# Patient Record
Sex: FEMALE | Race: WHITE | NOT HISPANIC OR LATINO
[De-identification: names, ages, dates, MRNs, and addresses within clinical notes are randomized per-mention and may not be internally consistent; named-entity substitution may affect disease eponyms.]

---

## 2017-04-21 ENCOUNTER — MEDICATION RENEWAL (OUTPATIENT)
Age: 27
End: 2017-04-21

## 2017-04-21 DIAGNOSIS — T75.3XXA MOTION SICKNESS, INITIAL ENCOUNTER: ICD-10-CM

## 2017-07-31 ENCOUNTER — APPOINTMENT (OUTPATIENT)
Dept: INTERNAL MEDICINE | Facility: CLINIC | Age: 27
End: 2017-07-31
Payer: COMMERCIAL

## 2017-07-31 VITALS
HEIGHT: 64 IN | WEIGHT: 98 LBS | HEART RATE: 94 BPM | BODY MASS INDEX: 16.73 KG/M2 | TEMPERATURE: 97.9 F | DIASTOLIC BLOOD PRESSURE: 72 MMHG | RESPIRATION RATE: 14 BRPM | OXYGEN SATURATION: 99 % | SYSTOLIC BLOOD PRESSURE: 104 MMHG

## 2017-07-31 DIAGNOSIS — J06.9 ACUTE UPPER RESPIRATORY INFECTION, UNSPECIFIED: ICD-10-CM

## 2017-07-31 PROCEDURE — 99214 OFFICE O/P EST MOD 30 MIN: CPT

## 2017-07-31 RX ORDER — SPIRONOLACTONE 25 MG/1
25 TABLET ORAL
Qty: 90 | Refills: 0 | Status: ACTIVE | COMMUNITY
Start: 2017-04-04

## 2017-07-31 RX ORDER — ALBUTEROL SULFATE 90 UG/1
108 (90 BASE) POWDER, METERED RESPIRATORY (INHALATION)
Qty: 1 | Refills: 0 | Status: ACTIVE | COMMUNITY
Start: 2017-07-27

## 2017-07-31 RX ORDER — METHYLPREDNISOLONE 4 MG/1
4 TABLET ORAL
Qty: 1 | Refills: 0 | Status: ACTIVE | COMMUNITY
Start: 2017-07-31 | End: 1900-01-01

## 2018-03-26 ENCOUNTER — MED ADMIN CHARGE (OUTPATIENT)
Age: 28
End: 2018-03-26

## 2018-03-26 RX ORDER — SCOPALAMINE 1 MG/3D
1 PATCH, EXTENDED RELEASE TRANSDERMAL
Qty: 1 | Refills: 0 | Status: ACTIVE | COMMUNITY
Start: 2017-04-21 | End: 1900-01-01

## 2018-05-08 ENCOUNTER — TRANSCRIPTION ENCOUNTER (OUTPATIENT)
Age: 28
End: 2018-05-08

## 2018-08-02 ENCOUNTER — TRANSCRIPTION ENCOUNTER (OUTPATIENT)
Age: 28
End: 2018-08-02

## 2018-10-30 ENCOUNTER — EMERGENCY (EMERGENCY)
Facility: HOSPITAL | Age: 28
LOS: 1 days | Discharge: HOME | End: 2018-10-30
Attending: EMERGENCY MEDICINE
Payer: COMMERCIAL

## 2018-10-30 VITALS
WEIGHT: 102.96 LBS | TEMPERATURE: 98 F | DIASTOLIC BLOOD PRESSURE: 102 MMHG | OXYGEN SATURATION: 99 % | HEART RATE: 100 BPM | HEIGHT: 64 IN | SYSTOLIC BLOOD PRESSURE: 154 MMHG | RESPIRATION RATE: 20 BRPM

## 2018-10-30 VITALS
OXYGEN SATURATION: 98 % | RESPIRATION RATE: 16 BRPM | DIASTOLIC BLOOD PRESSURE: 76 MMHG | TEMPERATURE: 98 F | SYSTOLIC BLOOD PRESSURE: 109 MMHG | HEART RATE: 75 BPM

## 2018-10-30 LAB
ALBUMIN SERPL ELPH-MCNC: 4.4 G/DL — SIGNIFICANT CHANGE UP (ref 3.3–5)
ALP SERPL-CCNC: 54 U/L — SIGNIFICANT CHANGE UP (ref 40–120)
ALT FLD-CCNC: 10 U/L — SIGNIFICANT CHANGE UP (ref 10–45)
ANION GAP SERPL CALC-SCNC: 13 MMOL/L — SIGNIFICANT CHANGE UP (ref 5–17)
APPEARANCE UR: CLEAR — SIGNIFICANT CHANGE UP
AST SERPL-CCNC: 15 U/L — SIGNIFICANT CHANGE UP (ref 10–40)
BACTERIA # UR AUTO: NEGATIVE — SIGNIFICANT CHANGE UP
BASOPHILS # BLD AUTO: 0 K/UL — SIGNIFICANT CHANGE UP (ref 0–0.2)
BASOPHILS NFR BLD AUTO: 0.5 % — SIGNIFICANT CHANGE UP (ref 0–2)
BILIRUB SERPL-MCNC: 0.4 MG/DL — SIGNIFICANT CHANGE UP (ref 0.2–1.2)
BILIRUB UR-MCNC: NEGATIVE — SIGNIFICANT CHANGE UP
BUN SERPL-MCNC: 9 MG/DL — SIGNIFICANT CHANGE UP (ref 7–23)
CALCIUM SERPL-MCNC: 9.3 MG/DL — SIGNIFICANT CHANGE UP (ref 8.4–10.5)
CHLORIDE SERPL-SCNC: 102 MMOL/L — SIGNIFICANT CHANGE UP (ref 96–108)
CO2 SERPL-SCNC: 23 MMOL/L — SIGNIFICANT CHANGE UP (ref 22–31)
COLOR SPEC: COLORLESS — SIGNIFICANT CHANGE UP
CREAT SERPL-MCNC: 0.56 MG/DL — SIGNIFICANT CHANGE UP (ref 0.5–1.3)
DIFF PNL FLD: NEGATIVE — SIGNIFICANT CHANGE UP
EOSINOPHIL # BLD AUTO: 0.1 K/UL — SIGNIFICANT CHANGE UP (ref 0–0.5)
EOSINOPHIL NFR BLD AUTO: 1.4 % — SIGNIFICANT CHANGE UP (ref 0–6)
EPI CELLS # UR: 0 /HPF — SIGNIFICANT CHANGE UP
GLUCOSE SERPL-MCNC: 99 MG/DL — SIGNIFICANT CHANGE UP (ref 70–99)
GLUCOSE UR QL: NEGATIVE — SIGNIFICANT CHANGE UP
HCT VFR BLD CALC: 42.2 % — SIGNIFICANT CHANGE UP (ref 34.5–45)
HGB BLD-MCNC: 14.2 G/DL — SIGNIFICANT CHANGE UP (ref 11.5–15.5)
HYALINE CASTS # UR AUTO: 0 /LPF — SIGNIFICANT CHANGE UP (ref 0–2)
KETONES UR-MCNC: SIGNIFICANT CHANGE UP
LEUKOCYTE ESTERASE UR-ACNC: NEGATIVE — SIGNIFICANT CHANGE UP
LYMPHOCYTES # BLD AUTO: 1.9 K/UL — SIGNIFICANT CHANGE UP (ref 1–3.3)
LYMPHOCYTES # BLD AUTO: 25.5 % — SIGNIFICANT CHANGE UP (ref 13–44)
MCHC RBC-ENTMCNC: 30.7 PG — SIGNIFICANT CHANGE UP (ref 27–34)
MCHC RBC-ENTMCNC: 33.6 GM/DL — SIGNIFICANT CHANGE UP (ref 32–36)
MCV RBC AUTO: 91.2 FL — SIGNIFICANT CHANGE UP (ref 80–100)
MONOCYTES # BLD AUTO: 0.6 K/UL — SIGNIFICANT CHANGE UP (ref 0–0.9)
MONOCYTES NFR BLD AUTO: 7.6 % — SIGNIFICANT CHANGE UP (ref 2–14)
NEUTROPHILS # BLD AUTO: 4.8 K/UL — SIGNIFICANT CHANGE UP (ref 1.8–7.4)
NEUTROPHILS NFR BLD AUTO: 65 % — SIGNIFICANT CHANGE UP (ref 43–77)
NITRITE UR-MCNC: NEGATIVE — SIGNIFICANT CHANGE UP
PH UR: 7 — SIGNIFICANT CHANGE UP (ref 5–8)
PLATELET # BLD AUTO: 261 K/UL — SIGNIFICANT CHANGE UP (ref 150–400)
POTASSIUM SERPL-MCNC: 3.8 MMOL/L — SIGNIFICANT CHANGE UP (ref 3.5–5.3)
POTASSIUM SERPL-SCNC: 3.8 MMOL/L — SIGNIFICANT CHANGE UP (ref 3.5–5.3)
PROT SERPL-MCNC: 7.6 G/DL — SIGNIFICANT CHANGE UP (ref 6–8.3)
PROT UR-MCNC: NEGATIVE — SIGNIFICANT CHANGE UP
RBC # BLD: 4.63 M/UL — SIGNIFICANT CHANGE UP (ref 3.8–5.2)
RBC # FLD: 11.5 % — SIGNIFICANT CHANGE UP (ref 10.3–14.5)
RBC CASTS # UR COMP ASSIST: 2 /HPF — SIGNIFICANT CHANGE UP (ref 0–4)
SODIUM SERPL-SCNC: 138 MMOL/L — SIGNIFICANT CHANGE UP (ref 135–145)
SP GR SPEC: 1.01 — LOW (ref 1.01–1.02)
UROBILINOGEN FLD QL: NEGATIVE — SIGNIFICANT CHANGE UP
WBC # BLD: 7.3 K/UL — SIGNIFICANT CHANGE UP (ref 3.8–10.5)
WBC # FLD AUTO: 7.3 K/UL — SIGNIFICANT CHANGE UP (ref 3.8–10.5)
WBC UR QL: 0 /HPF — SIGNIFICANT CHANGE UP (ref 0–5)

## 2018-10-30 PROCEDURE — 81001 URINALYSIS AUTO W/SCOPE: CPT

## 2018-10-30 PROCEDURE — 93975 VASCULAR STUDY: CPT | Mod: 26

## 2018-10-30 PROCEDURE — 76830 TRANSVAGINAL US NON-OB: CPT

## 2018-10-30 PROCEDURE — 76830 TRANSVAGINAL US NON-OB: CPT | Mod: 26

## 2018-10-30 PROCEDURE — 74177 CT ABD & PELVIS W/CONTRAST: CPT | Mod: 26

## 2018-10-30 PROCEDURE — 93975 VASCULAR STUDY: CPT

## 2018-10-30 PROCEDURE — 84702 CHORIONIC GONADOTROPIN TEST: CPT

## 2018-10-30 PROCEDURE — 87086 URINE CULTURE/COLONY COUNT: CPT

## 2018-10-30 PROCEDURE — 80053 COMPREHEN METABOLIC PANEL: CPT

## 2018-10-30 PROCEDURE — 76705 ECHO EXAM OF ABDOMEN: CPT | Mod: 26,59

## 2018-10-30 PROCEDURE — 74177 CT ABD & PELVIS W/CONTRAST: CPT

## 2018-10-30 PROCEDURE — 85027 COMPLETE CBC AUTOMATED: CPT

## 2018-10-30 PROCEDURE — 99284 EMERGENCY DEPT VISIT MOD MDM: CPT | Mod: 25

## 2018-10-30 PROCEDURE — 76705 ECHO EXAM OF ABDOMEN: CPT

## 2018-10-30 RX ORDER — ACETAMINOPHEN 500 MG
975 TABLET ORAL ONCE
Qty: 0 | Refills: 0 | Status: COMPLETED | OUTPATIENT
Start: 2018-10-30 | End: 2018-10-30

## 2018-10-30 RX ORDER — ACETAMINOPHEN 500 MG
650 TABLET ORAL ONCE
Qty: 0 | Refills: 0 | Status: COMPLETED | OUTPATIENT
Start: 2018-10-30 | End: 2018-10-30

## 2018-10-30 RX ADMIN — Medication 650 MILLIGRAM(S): at 13:00

## 2018-10-30 RX ADMIN — Medication 975 MILLIGRAM(S): at 07:28

## 2018-10-30 NOTE — ED ADULT NURSE NOTE - OBJECTIVE STATEMENT
29 y/o female presents to ED c/o R sided abdominal pain that started at 1:30am this morning. Pt was laying in bed when she began feeling "excruciating, sharp pain on the right side." She felt like she had to make a BM, went to bathroom and had one episode of soft stool, which she says gave her mild relief. Pain was intermittent after that and at its worst is 9/10. Also reports some chills and nausea when pain worsens. Denies chest pain, SOB, vomiting, fever. Lungs clear b/l. Skin warm, dry, intact. Abd. soft, non-distended, tender upon palpation by RLQ. Gross motor and neuro intact. 20G IV placed in LAC. Safety and comfort provided. Family at bedside.

## 2018-10-30 NOTE — ED ADULT NURSE NOTE - ADDITIONAL PRINTED INSTRUCTIONS GIVEN
Deirdre CHAUDHARI, pt verbalizes understanding to f/u with PCP and return to ED for any worsening symptoms.

## 2018-10-30 NOTE — ED PROVIDER NOTE - MEDICAL DECISION MAKING DETAILS
28F hx of right ovarian cyst w/ rlq pain, concern for torsion vs appendicits vs cyst rupture, no GI or dysuria, pain control, u/s followed by possible ct pending results,

## 2018-10-30 NOTE — ED PROVIDER NOTE - PHYSICAL EXAMINATION
Gen: AAOx3, NAD, well appearing   Head: NCAT  ENT: Airway patent, moist mucous membrane  Cardiac: Normal rate, normal rhythm, no murmurs  Respiratory: Lungs CTA B/L  Gastrointestinal: Abdomen soft, + TTP RLQ, + mcburneys, + rovsings, nondistended, no rebound, no guarding, no organomegaly   : Chaperoned by Deirdre Li RN, no adnexal tenderness, white thin discharge in vaginal vault with normal nonparous cervix, no adnexal fullness appreciated, neg CVAT   MSK: No gross abnormalities, FROM of all four extremities, no edema  HEME: Extremities warm, pulses intact and symmetrical in all four extremities  Skin: No rashes, no lesions  Neuro: No gross neurologic deficits

## 2018-10-30 NOTE — ED PROVIDER NOTE - NS ED ROS FT
CONST: no fevers, no chills, weight loss, weakness, appetite changes  EYES: no pain, vision loss/dipoplia/decreased vision  ENT: no sore throat, no cough  CV: no chest pain, no palpitations  RESP: no shortness of breath  ABD: no abdominal pain, no nausea, no vomitting  : no dysuria, increased frequency, or hematuria  MSK: no back pain  NEURO: no headache or additional neurologic complaints  HEME: no easy bleeding  SKIN:  no rash CONST: no fevers, no chills, no weight loss, no weakness, +appetite changes  EYES: no pain or vision changes  ENT: no sore throat, no cough  CV: no chest pain, no palpitations  RESP: no shortness of breath  ABD: + abdominal pain, + nausea, no vomiting  : no dysuria, increased frequency, or hematuria  MSK: no back pain  NEURO: no headache or additional neurologic complaints  HEME: no easy bleeding  SKIN:  no rash

## 2018-10-30 NOTE — ED PROVIDER NOTE - PROGRESS NOTE DETAILS
Magallon PGY2: Pt continues to have dull abd pain, CT scans reviewed with some free pelvic fluid, possibility of cyst rupture, no adnexal tenderness on bimanual exam, and otherwise skin exam unremarkable, will have pt f/u with outpt pcp and return precautions given.

## 2018-10-30 NOTE — ED PROVIDER NOTE - CARE PLAN
Principal Discharge DX:	Abdominal pain  Assessment and plan of treatment:	You were seen today for abdominal pain with some free pelvic fluid suggesting the possible of an ovarian cyst rupture. There was no appendicitis seen on your CT scan, please return if you develop fevers/ chills severe worsening or pain or inability to tolerate foods or if characterization of pain changes and is worse.     1) Please follow-up with your primary care doctor within the next 3 days.  Please call today or tomorrow for an appointment.  If you cannot follow-up with your doctor(s), please return to the ED for any urgent issues.  2) If you have any worsening of symptoms or any other concerns please return to the ED immediately.  3) Please continue taking your home medications as directed.  4) You may have been given a copy of your labs and/or imaging.  Please go over these with your primary care doctor.

## 2018-10-30 NOTE — ED ADULT TRIAGE NOTE - CHIEF COMPLAINT QUOTE
Patient reports abdominal pain since 130 AM.  Patient c/o R lower quadrant abdominal pain.  Pt has hx ovarian cysts, but states this pain is different.  Pt states the pain comes and goes.  +nausea, no vomiting

## 2018-10-30 NOTE — ED ADULT NURSE NOTE - NSIMPLEMENTINTERV_GEN_ALL_ED
Implemented All Universal Safety Interventions:  [place] to call system. Call bell, personal items and telephone within reach. Instruct patient to call for assistance. Room bathroom lighting operational. Non-slip footwear when patient is off stretcher. Physically safe environment: no spills, clutter or unnecessary equipment. Stretcher in lowest position, wheels locked, appropriate side rails in place.

## 2018-10-30 NOTE — ED ADULT NURSE REASSESSMENT NOTE - NS ED NURSE REASSESS COMMENT FT1
Pt AAOx4, NAD, resp nonlabored, resting comfortably in bed with family at bedside. Pt c/o continued RLQ abd pain, pain meds administered as per MD orders. Pt denies headache, dizziness, chest pain, palpitations, SOB, abd pain, n/v/d, urinary symptoms, fevers, chills, weakness at this time. Pt awaiting US, Dr. Magallon completed pelvic exam with RN present. Safety maintained with call bell within reach.

## 2018-10-30 NOTE — ED PROVIDER NOTE - PLAN OF CARE
You were seen today for abdominal pain with some free pelvic fluid suggesting the possible of an ovarian cyst rupture. There was no appendicitis seen on your CT scan, please return if you develop fevers/ chills severe worsening or pain or inability to tolerate foods or if characterization of pain changes and is worse.     1) Please follow-up with your primary care doctor within the next 3 days.  Please call today or tomorrow for an appointment.  If you cannot follow-up with your doctor(s), please return to the ED for any urgent issues.  2) If you have any worsening of symptoms or any other concerns please return to the ED immediately.  3) Please continue taking your home medications as directed.  4) You may have been given a copy of your labs and/or imaging.  Please go over these with your primary care doctor.

## 2018-10-30 NOTE — ED PROVIDER NOTE - OBJECTIVE STATEMENT
28F hx right ovarian cyst c/o RLQ pain since 1:30AM, noted as sharp, localized, intermittent in waves, with associated nausea without emesis, no dysuria or diarrhea, no vaginal discharge, LMP 1.5 wks ago, 28F hx right ovarian cyst c/o RLQ pain since 1:30AM, noted as sharp, localized, intermittent in waves, worsened with movement/ extension, with associated nausea without emesis, no dysuria or diarrhea, no vaginal discharge, LMP 1.5 wks ago, notes some decrease in appetite today 28F hx right ovarian cyst c/o RLQ pain since 1:30AM, noted as sharp, localized, intermittent in waves, worsened with movement/ extension, with associated nausea without emesis, no dysuria or diarrhea, no vaginal discharge, LMP 1.5 wks ago, notes some decrease in appetite today    Attendinyo female presents with RLQ pain since 1am today associated with decreased appetite.  No fever.  no vomiting . pain worse with movement.  felt fine yesterday.  pain is sharp.

## 2018-10-31 LAB
CULTURE RESULTS: NO GROWTH — SIGNIFICANT CHANGE UP
SPECIMEN SOURCE: SIGNIFICANT CHANGE UP

## 2018-11-03 NOTE — PROCEDURE NOTE - ADDITIONAL PROCEDURE DETAILS
Emergency Department Focused Ultrasound performed at patient's bedside for educational purposes. The study will have a follow up study performed or was performed in the direct supervision of an ultrasound trained attending.
Emergency Department Focused Ultrasound performed at patient's bedside for educational purposes. The study will have a follow up study performed or was performed in the direct supervision of an ultrasound trained attending.

## 2019-07-18 ENCOUNTER — RESULT REVIEW (OUTPATIENT)
Age: 29
End: 2019-07-18

## 2019-12-09 ENCOUNTER — TRANSCRIPTION ENCOUNTER (OUTPATIENT)
Age: 29
End: 2019-12-09

## 2020-12-10 ENCOUNTER — TRANSCRIPTION ENCOUNTER (OUTPATIENT)
Age: 30
End: 2020-12-10

## 2020-12-15 PROBLEM — J06.9 UPPER RESPIRATORY INFECTION, ACUTE: Status: RESOLVED | Noted: 2017-07-31 | Resolved: 2020-12-15

## 2021-11-23 ENCOUNTER — TRANSCRIPTION ENCOUNTER (OUTPATIENT)
Age: 31
End: 2021-11-23

## 2022-10-10 ENCOUNTER — RESULT REVIEW (OUTPATIENT)
Age: 32
End: 2022-10-10

## 2023-02-17 ENCOUNTER — NON-APPOINTMENT (OUTPATIENT)
Age: 33
End: 2023-02-17

## 2023-07-11 ENCOUNTER — APPOINTMENT (OUTPATIENT)
Dept: OBGYN | Facility: CLINIC | Age: 33
End: 2023-07-11
Payer: COMMERCIAL

## 2023-07-11 VITALS
SYSTOLIC BLOOD PRESSURE: 125 MMHG | HEIGHT: 64 IN | DIASTOLIC BLOOD PRESSURE: 79 MMHG | BODY MASS INDEX: 19.97 KG/M2 | WEIGHT: 117 LBS

## 2023-07-11 DIAGNOSIS — N92.6 IRREGULAR MENSTRUATION, UNSPECIFIED: ICD-10-CM

## 2023-07-11 DIAGNOSIS — N63.32 UNSPECIFIED LUMP IN AXILLARY TAIL OF THE LEFT BREAST: ICD-10-CM

## 2023-07-11 DIAGNOSIS — Z01.419 ENCOUNTER FOR GYNECOLOGICAL EXAMINATION (GENERAL) (ROUTINE) W/OUT ABNORMAL FINDINGS: ICD-10-CM

## 2023-07-11 PROCEDURE — 99385 PREV VISIT NEW AGE 18-39: CPT

## 2023-07-11 NOTE — HISTORY OF PRESENT ILLNESS
[FreeTextEntry1] : pt is a 32 y/o p0 lmp 6/14 presents for new pt annual gyn visit with report of change in menstrual bleeding since covid.

## 2023-07-13 LAB
CYTOLOGY CVX/VAG DOC THIN PREP: NORMAL
HPV HIGH+LOW RISK DNA PNL CVX: NOT DETECTED

## 2023-07-19 LAB
ANTI-MUELLERIAN HORMONE: 3.39 NG/ML
FSH SERPL-MCNC: 7.3 IU/L
LH SERPL-ACNC: 10 IU/L
PROLACTIN SERPL-MCNC: 14.8 NG/ML
TESTOST SERPL-MCNC: 19.1 NG/DL
TSH SERPL-ACNC: 1.89 UIU/ML

## 2023-07-24 LAB
17OHP SERPL-MCNC: 47 NG/DL
DHEA-SULFATE, SERUM: 73 UG/DL

## 2023-08-29 ENCOUNTER — NON-APPOINTMENT (OUTPATIENT)
Age: 33
End: 2023-08-29

## 2023-09-25 ENCOUNTER — NON-APPOINTMENT (OUTPATIENT)
Age: 33
End: 2023-09-25

## 2024-06-03 ENCOUNTER — ASOB RESULT (OUTPATIENT)
Age: 34
End: 2024-06-03

## 2024-06-03 ENCOUNTER — APPOINTMENT (OUTPATIENT)
Dept: ANTEPARTUM | Facility: CLINIC | Age: 34
End: 2024-06-03
Payer: COMMERCIAL

## 2024-06-03 PROCEDURE — 76811 OB US DETAILED SNGL FETUS: CPT

## 2024-06-19 ENCOUNTER — ASOB RESULT (OUTPATIENT)
Age: 34
End: 2024-06-19

## 2024-06-19 ENCOUNTER — APPOINTMENT (OUTPATIENT)
Dept: ANTEPARTUM | Facility: CLINIC | Age: 34
End: 2024-06-19
Payer: COMMERCIAL

## 2024-06-19 PROCEDURE — 76816 OB US FOLLOW-UP PER FETUS: CPT

## 2024-07-23 ENCOUNTER — NON-APPOINTMENT (OUTPATIENT)
Age: 34
End: 2024-07-23

## 2024-10-12 ENCOUNTER — INPATIENT (INPATIENT)
Facility: HOSPITAL | Age: 34
LOS: 3 days | Discharge: HOME | End: 2024-10-16
Attending: OBSTETRICS & GYNECOLOGY | Admitting: OBSTETRICS & GYNECOLOGY
Payer: COMMERCIAL

## 2024-10-12 VITALS
RESPIRATION RATE: 18 BRPM | OXYGEN SATURATION: 96 % | SYSTOLIC BLOOD PRESSURE: 126 MMHG | TEMPERATURE: 98 F | DIASTOLIC BLOOD PRESSURE: 83 MMHG | HEART RATE: 92 BPM

## 2024-10-12 DIAGNOSIS — O26.899 OTHER SPECIFIED PREGNANCY RELATED CONDITIONS, UNSPECIFIED TRIMESTER: ICD-10-CM

## 2024-10-12 DIAGNOSIS — Z90.89 ACQUIRED ABSENCE OF OTHER ORGANS: Chronic | ICD-10-CM

## 2024-10-12 DIAGNOSIS — Z98.890 OTHER SPECIFIED POSTPROCEDURAL STATES: Chronic | ICD-10-CM

## 2024-10-12 DIAGNOSIS — Z86.19 PERSONAL HISTORY OF OTHER INFECTIOUS AND PARASITIC DISEASES: Chronic | ICD-10-CM

## 2024-10-12 DIAGNOSIS — Z34.80 ENCOUNTER FOR SUPERVISION OF OTHER NORMAL PREGNANCY, UNSPECIFIED TRIMESTER: ICD-10-CM

## 2024-10-12 LAB
BASOPHILS # BLD AUTO: 0.07 K/UL — SIGNIFICANT CHANGE UP (ref 0–0.2)
BASOPHILS NFR BLD AUTO: 0.7 % — SIGNIFICANT CHANGE UP (ref 0–2)
EOSINOPHIL # BLD AUTO: 0.07 K/UL — SIGNIFICANT CHANGE UP (ref 0–0.5)
EOSINOPHIL NFR BLD AUTO: 0.7 % — SIGNIFICANT CHANGE UP (ref 0–6)
GLUCOSE BLDC GLUCOMTR-MCNC: 125 MG/DL — HIGH (ref 70–99)
HCT VFR BLD CALC: 38.6 % — SIGNIFICANT CHANGE UP (ref 34.5–45)
HGB BLD-MCNC: 12.7 G/DL — SIGNIFICANT CHANGE UP (ref 11.5–15.5)
IMM GRANULOCYTES NFR BLD AUTO: 1.2 % — HIGH (ref 0–0.9)
LYMPHOCYTES # BLD AUTO: 1.86 K/UL — SIGNIFICANT CHANGE UP (ref 1–3.3)
LYMPHOCYTES # BLD AUTO: 17.8 % — SIGNIFICANT CHANGE UP (ref 13–44)
MCHC RBC-ENTMCNC: 30.5 PG — SIGNIFICANT CHANGE UP (ref 27–34)
MCHC RBC-ENTMCNC: 32.9 GM/DL — SIGNIFICANT CHANGE UP (ref 32–36)
MCV RBC AUTO: 92.6 FL — SIGNIFICANT CHANGE UP (ref 80–100)
MONOCYTES # BLD AUTO: 0.85 K/UL — SIGNIFICANT CHANGE UP (ref 0–0.9)
MONOCYTES NFR BLD AUTO: 8.1 % — SIGNIFICANT CHANGE UP (ref 2–14)
NEUTROPHILS # BLD AUTO: 7.48 K/UL — HIGH (ref 1.8–7.4)
NEUTROPHILS NFR BLD AUTO: 71.5 % — SIGNIFICANT CHANGE UP (ref 43–77)
NRBC # BLD: 0 /100 WBCS — SIGNIFICANT CHANGE UP (ref 0–0)
PLATELET # BLD AUTO: 248 K/UL — SIGNIFICANT CHANGE UP (ref 150–400)
RBC # BLD: 4.17 M/UL — SIGNIFICANT CHANGE UP (ref 3.8–5.2)
RBC # FLD: 13.6 % — SIGNIFICANT CHANGE UP (ref 10.3–14.5)
WBC # BLD: 10.46 K/UL — SIGNIFICANT CHANGE UP (ref 3.8–10.5)
WBC # FLD AUTO: 10.46 K/UL — SIGNIFICANT CHANGE UP (ref 3.8–10.5)

## 2024-10-12 RX ORDER — FAMOTIDINE 40 MG
20 TABLET ORAL ONCE
Refills: 0 | Status: COMPLETED | OUTPATIENT
Start: 2024-10-12 | End: 2024-10-12

## 2024-10-12 RX ORDER — SODIUM CHLORIDE IRRIG SOLUTION 0.9 %
1000 SOLUTION, IRRIGATION IRRIGATION ONCE
Refills: 0 | Status: DISCONTINUED | OUTPATIENT
Start: 2024-10-12 | End: 2024-10-16

## 2024-10-12 RX ORDER — INFLUENZA VIRUS VACCINE 15; 15; 15; 15 UG/.5ML; UG/.5ML; UG/.5ML; UG/.5ML
0.5 SUSPENSION INTRAMUSCULAR ONCE
Refills: 0 | Status: DISCONTINUED | OUTPATIENT
Start: 2024-10-12 | End: 2024-10-16

## 2024-10-12 RX ORDER — SODIUM CHLORIDE 0.9 % (FLUSH) 0.9 %
300 SYRINGE (ML) INJECTION ONCE
Refills: 0 | Status: COMPLETED | OUTPATIENT
Start: 2024-10-12 | End: 2024-10-12

## 2024-10-12 RX ORDER — SODIUM CHLORIDE IRRIG SOLUTION 0.9 %
1000 SOLUTION, IRRIGATION IRRIGATION
Refills: 0 | Status: DISCONTINUED | OUTPATIENT
Start: 2024-10-12 | End: 2024-10-13

## 2024-10-12 RX ORDER — CHLORHEXIDINE GLUCONATE ORAL RINSE 1.2 MG/ML
1 SOLUTION DENTAL DAILY
Refills: 0 | Status: DISCONTINUED | OUTPATIENT
Start: 2024-10-12 | End: 2024-10-13

## 2024-10-12 RX ORDER — OXYTOCIN/RINGER'S LACTATE 20/500ML
PLASTIC BAG, INJECTION (ML) INTRAVENOUS
Qty: 30 | Refills: 0 | Status: DISCONTINUED | OUTPATIENT
Start: 2024-10-12 | End: 2024-10-13

## 2024-10-12 RX ORDER — SODIUM CITRATE AND CITRIC ACID MONOHYDRATE 334; 500 MG/5ML; MG/5ML
15 SOLUTION ORAL EVERY 6 HOURS
Refills: 0 | Status: DISCONTINUED | OUTPATIENT
Start: 2024-10-12 | End: 2024-10-13

## 2024-10-12 RX ORDER — OXYTOCIN/RINGER'S LACTATE 20/500ML
4 PLASTIC BAG, INJECTION (ML) INTRAVENOUS
Qty: 30 | Refills: 0 | Status: DISCONTINUED | OUTPATIENT
Start: 2024-10-12 | End: 2024-10-16

## 2024-10-12 RX ORDER — OXYTOCIN/RINGER'S LACTATE 20/500ML
167 PLASTIC BAG, INJECTION (ML) INTRAVENOUS
Qty: 30 | Refills: 0 | Status: COMPLETED | OUTPATIENT
Start: 2024-10-12 | End: 2024-10-12

## 2024-10-12 RX ORDER — SODIUM CHLORIDE IRRIG SOLUTION 0.9 %
500 SOLUTION, IRRIGATION IRRIGATION ONCE
Refills: 0 | Status: COMPLETED | OUTPATIENT
Start: 2024-10-12 | End: 2024-10-12

## 2024-10-12 RX ORDER — SODIUM CHLORIDE 0.9 % (FLUSH) 0.9 %
1000 SYRINGE (ML) INJECTION
Refills: 0 | Status: DISCONTINUED | OUTPATIENT
Start: 2024-10-12 | End: 2024-10-16

## 2024-10-12 RX ORDER — DIPHENHYDRAMINE HCL 12.5MG/5ML
25 LIQUID (ML) ORAL ONCE
Refills: 0 | Status: COMPLETED | OUTPATIENT
Start: 2024-10-12 | End: 2024-10-12

## 2024-10-12 RX ADMIN — Medication 20 MILLIGRAM(S): at 20:58

## 2024-10-12 RX ADMIN — Medication 500 MILLILITER(S): at 21:31

## 2024-10-12 RX ADMIN — Medication 500 MILLILITER(S): at 20:33

## 2024-10-12 RX ADMIN — Medication 125 MILLILITER(S): at 11:20

## 2024-10-12 RX ADMIN — Medication 300 MILLILITER(S): at 22:08

## 2024-10-12 RX ADMIN — Medication 125 MILLILITER(S): at 12:26

## 2024-10-12 RX ADMIN — Medication 125 MILLILITER(S): at 22:30

## 2024-10-12 RX ADMIN — Medication 25 MILLIGRAM(S): at 21:00

## 2024-10-12 RX ADMIN — SODIUM CITRATE AND CITRIC ACID MONOHYDRATE 15 MILLILITER(S): 334; 500 SOLUTION ORAL at 20:23

## 2024-10-12 NOTE — OB RN PATIENT PROFILE - WEIGHT: PREPREGNANCY IN LBS
Problem: Chronic Conditions and Co-morbidities  Goal: Patient's chronic conditions and co-morbidity symptoms are monitored and maintained or improved  Outcome: Progressing  Flowsheets (Taken 12/17/2022 2000)  Care Plan - Patient's Chronic Conditions and Co-Morbidity Symptoms are Monitored and Maintained or Improved: Monitor and assess patient's chronic conditions and comorbid symptoms for stability, deterioration, or improvement     Problem: Discharge Planning  Goal: Discharge to home or other facility with appropriate resources  Outcome: Progressing  Flowsheets (Taken 12/17/2022 2000)  Discharge to home or other facility with appropriate resources: Identify barriers to discharge with patient and caregiver     Problem: Safety - Adult  Goal: Free from fall injury  Outcome: Progressing     Problem: Pain  Goal: Verbalizes/displays adequate comfort level or baseline comfort level  Outcome: Progressing  Flowsheets (Taken 12/17/2022 2000)  Verbalizes/displays adequate comfort level or baseline comfort level: Encourage patient to monitor pain and request assistance     Problem: Nutrition Deficit:  Goal: Optimize nutritional status  Outcome: Progressing 115

## 2024-10-12 NOTE — OB RN PATIENT PROFILE - NSICDXPASTSURGICALHX_GEN_ALL_CORE_FT
PAST SURGICAL HISTORY:  Anxiety     H/O eye surgery     History of shingles     History of tonsillectomy

## 2024-10-12 NOTE — OB PROVIDER H&P - ASSESSMENT
A/P: 33yo G1 @ 39w3d here for IOL 2/ PROM @ 8a  - Admit to L&D  - Routine labs   - EFM/TOCO: resuscitative measures prn  - buccal cytotec as IOL agent  - place cervical tijerina balloon at 5p  - Anesthesia consult for epidural prn  - Expect     d/w Dr. Jones Kirk, PA-C

## 2024-10-12 NOTE — OB RN PATIENT PROFILE - FALL HARM RISK - UNIVERSAL INTERVENTIONS
Bed in lowest position, wheels locked, appropriate side rails in place/Call bell, personal items and telephone in reach/Instruct patient to call for assistance before getting out of bed or chair/Non-slip footwear when patient is out of bed/[place] to call system/Physically safe environment - no spills, clutter or unnecessary equipment/Purposeful Proactive Rounding/Room/bathroom lighting operational, light cord in reach

## 2024-10-12 NOTE — OB RN PATIENT PROFILE - VISION (WITH CORRECTIVE LENSES IF THE PATIENT USUALLY WEARS THEM):
Uses contacts/Normal vision: sees adequately in most situations; can see medication labels, newsprint

## 2024-10-12 NOTE — PRE-ANESTHESIA EVALUATION ADULT - MALLAMPATI CLASS
Class I (easy) - visualization of the soft palate, fauces, uvula, and both anterior and posterior pillars
home

## 2024-10-12 NOTE — OB RN PATIENT PROFILE - POST PARTUM DEPRESSION SCREEN OB 2
Called patient to confirm refill request. Patient asked that we disregard her request and just fill a 30 day supply to her local pharmacy. She has been under a lot of stress lately. She notices she has been having a lot more headaches and tension on her neck and upper back. She is in the process of adopting her great niece and nephew and the children's parents are giving her a hard time.     She did not want to schedule an appointment at this time but would like a refill on her medication.   yes

## 2024-10-12 NOTE — OB PROVIDER H&P - NSHPPHYSICALEXAM_GEN_ALL_CORE
PE:    T(C): 36.9 (10-12-24 @ 11:02), Max: 36.9 (10-12-24 @ 11:02)  HR: 90 (10-12-24 @ 11:42) (88 - 104)  BP: 126/83 (10-12-24 @ 11:02) (126/83 - 126/83)  RR: 18 (10-12-24 @ 11:02) (18 - 18)  SpO2: 95% (10-12-24 @ 11:42) (93% - 96%)    General: NAD, A&Ox3  CV: RRR  Lungs: Clear bilat   Abd: soft, nontender, gravid  SSE: grossly ruptured with pooling  VE: 0/0/-3  BSUS: vertex  EFM: 150/moderate variablity/-accels/-decels  TOCO: none

## 2024-10-12 NOTE — OB PROVIDER H&P - NS ATTEND AMEND GEN_ALL_CORE FT
33yo  @39w3d, GBS neg, admitted with PROM. Reports gush of fluid this AM with continuous leaking. Mild intermittent contractions. Denies vaginal bleeding. Good fetal movement. Pregnancy uncomplicated.    Vital Signs Last 24 Hrs  T(C): 36.9 (12 Oct 2024 11:02), Max: 36.9 (12 Oct 2024 11:02)  T(F): 98.42 (12 Oct 2024 11:02), Max: 98.42 (12 Oct 2024 11:02)  HR: 81 (12 Oct 2024 13:15) (75 - 104)  BP: 126/83 (12 Oct 2024 11:02) (126/83 - 126/83)  RR: 18 (12 Oct 2024 11:02) (18 - 18)  SpO2: 94% (12 Oct 2024 13:15) (93% - 98%)    Gen: AOx3 NAD  Abd: soft, gravid, nontender  toco: irregular ctx  efm: cat I  SVE: 0/0/-3 per PA    33yo  @39w3d, GBS neg, admitted with PROM.     -buccal cytotec for cervical ripening  -will consider CRB at next exam

## 2024-10-12 NOTE — OB PROVIDER H&P - HISTORY OF PRESENT ILLNESS
Pt is a 33yo G1 @ 39w3d here with ROR, clear since 8a. Pt denies vb and ctx. +FM. PNC uncomplicated GBS (-) EFW 3200g    OBHx: primip  GYNHx: denies ovarian cysts, fibroids, hx of abnormal pap or STDs  PMHx: Celiac dz  PSurgHx: Tonsillectomy; eye sx  Allergies: NKDA  Meds: PNV  Social: No smoking, alcohol, or illicit drug use  Psych: +hx of anxiety; denies depression

## 2024-10-12 NOTE — OB RN PATIENT PROFILE - FUNCTIONAL ASSESSMENT - DAILY ACTIVITY ASSESSMENT TYPE
Transitional Care Management Telephone Call Attempt    Discharge Date: 3/27/23  [facility]    Call Attempt Date: 3/29/2023  Call Attempt: First     Briefly spoke with patient, she states she is at the Dr. Office and is requesting a call back on 3/30.    Livia Armas, MSN, RN  Care Transitions Nurse  [facility]  [phone]       Admission

## 2024-10-13 LAB
ALBUMIN SERPL ELPH-MCNC: 2.8 G/DL — LOW (ref 3.3–5)
ALP SERPL-CCNC: 133 U/L — HIGH (ref 40–120)
ALT FLD-CCNC: 12 U/L — SIGNIFICANT CHANGE UP (ref 10–45)
ANION GAP SERPL CALC-SCNC: 11 MMOL/L — SIGNIFICANT CHANGE UP (ref 5–17)
ANISOCYTOSIS BLD QL: SLIGHT — SIGNIFICANT CHANGE UP
AST SERPL-CCNC: 18 U/L — SIGNIFICANT CHANGE UP (ref 10–40)
BASOPHILS # BLD AUTO: 0 K/UL — SIGNIFICANT CHANGE UP (ref 0–0.2)
BASOPHILS NFR BLD AUTO: 0 % — SIGNIFICANT CHANGE UP (ref 0–2)
BILIRUB SERPL-MCNC: 0.5 MG/DL — SIGNIFICANT CHANGE UP (ref 0.2–1.2)
BUN SERPL-MCNC: 5 MG/DL — LOW (ref 7–23)
CALCIUM SERPL-MCNC: 7.9 MG/DL — LOW (ref 8.4–10.5)
CHLORIDE SERPL-SCNC: 105 MMOL/L — SIGNIFICANT CHANGE UP (ref 96–108)
CO2 SERPL-SCNC: 18 MMOL/L — LOW (ref 22–31)
CREAT SERPL-MCNC: 0.47 MG/DL — LOW (ref 0.5–1.3)
EGFR: 127 ML/MIN/1.73M2 — SIGNIFICANT CHANGE UP
EOSINOPHIL # BLD AUTO: 0 K/UL — SIGNIFICANT CHANGE UP (ref 0–0.5)
EOSINOPHIL NFR BLD AUTO: 0 % — SIGNIFICANT CHANGE UP (ref 0–6)
GLUCOSE SERPL-MCNC: 100 MG/DL — HIGH (ref 70–99)
HCT VFR BLD CALC: 31 % — LOW (ref 34.5–45)
HCV AB S/CO SERPL IA: 0.05 S/CO — SIGNIFICANT CHANGE UP
HCV AB SERPL-IMP: SIGNIFICANT CHANGE UP
HGB BLD-MCNC: 10.1 G/DL — LOW (ref 11.5–15.5)
LDH SERPL L TO P-CCNC: 175 U/L — SIGNIFICANT CHANGE UP (ref 50–242)
LYMPHOCYTES # BLD AUTO: 0.51 K/UL — LOW (ref 1–3.3)
LYMPHOCYTES # BLD AUTO: 2.6 % — LOW (ref 13–44)
MACROCYTES BLD QL: SLIGHT — SIGNIFICANT CHANGE UP
MANUAL SMEAR VERIFICATION: SIGNIFICANT CHANGE UP
MCHC RBC-ENTMCNC: 30.6 PG — SIGNIFICANT CHANGE UP (ref 27–34)
MCHC RBC-ENTMCNC: 32.6 GM/DL — SIGNIFICANT CHANGE UP (ref 32–36)
MCV RBC AUTO: 93.9 FL — SIGNIFICANT CHANGE UP (ref 80–100)
METAMYELOCYTES # FLD: 0.9 % — HIGH (ref 0–0)
MONOCYTES # BLD AUTO: 1.38 K/UL — HIGH (ref 0–0.9)
MONOCYTES NFR BLD AUTO: 7.1 % — SIGNIFICANT CHANGE UP (ref 2–14)
NEUTROPHILS # BLD AUTO: 17.4 K/UL — HIGH (ref 1.8–7.4)
NEUTROPHILS NFR BLD AUTO: 82.3 % — HIGH (ref 43–77)
NEUTS BAND # BLD: 7.1 % — SIGNIFICANT CHANGE UP (ref 0–8)
PLAT MORPH BLD: NORMAL — SIGNIFICANT CHANGE UP
PLATELET # BLD AUTO: 184 K/UL — SIGNIFICANT CHANGE UP (ref 150–400)
POLYCHROMASIA BLD QL SMEAR: SLIGHT — SIGNIFICANT CHANGE UP
POTASSIUM SERPL-MCNC: 3.6 MMOL/L — SIGNIFICANT CHANGE UP (ref 3.5–5.3)
POTASSIUM SERPL-SCNC: 3.6 MMOL/L — SIGNIFICANT CHANGE UP (ref 3.5–5.3)
PROT SERPL-MCNC: 5.1 G/DL — LOW (ref 6–8.3)
RBC # BLD: 3.3 M/UL — LOW (ref 3.8–5.2)
RBC # FLD: 13.7 % — SIGNIFICANT CHANGE UP (ref 10.3–14.5)
RBC BLD AUTO: SIGNIFICANT CHANGE UP
SODIUM SERPL-SCNC: 134 MMOL/L — LOW (ref 135–145)
T PALLIDUM AB TITR SER: NEGATIVE — SIGNIFICANT CHANGE UP
URATE SERPL-MCNC: 3.8 MG/DL — SIGNIFICANT CHANGE UP (ref 2.5–7)
WBC # BLD: 19.46 K/UL — HIGH (ref 3.8–10.5)
WBC # FLD AUTO: 19.46 K/UL — HIGH (ref 3.8–10.5)

## 2024-10-13 DEVICE — SURGICEL POWDER 3 GRAMS: Type: IMPLANTABLE DEVICE | Status: FUNCTIONAL

## 2024-10-13 RX ORDER — PIPERACILLIN SODIUM AND TAZOBACTAM SODIUM 12; 1.5 G/60ML; G/60ML
4.5 INJECTION, POWDER, LYOPHILIZED, FOR SOLUTION INTRAVENOUS EVERY 8 HOURS
Refills: 0 | Status: COMPLETED | OUTPATIENT
Start: 2024-10-13 | End: 2024-10-14

## 2024-10-13 RX ORDER — PIPERACILLIN SODIUM AND TAZOBACTAM SODIUM 12; 1.5 G/60ML; G/60ML
4.5 INJECTION, POWDER, LYOPHILIZED, FOR SOLUTION INTRAVENOUS ONCE
Refills: 0 | Status: DISCONTINUED | OUTPATIENT
Start: 2024-10-13 | End: 2024-10-13

## 2024-10-13 RX ORDER — KETOROLAC TROMETHAMINE 10 MG/1
30 TABLET, FILM COATED ORAL EVERY 6 HOURS
Refills: 0 | Status: DISCONTINUED | OUTPATIENT
Start: 2024-10-13 | End: 2024-10-14

## 2024-10-13 RX ORDER — ACETAMINOPHEN 325 MG
975 TABLET ORAL
Refills: 0 | Status: DISCONTINUED | OUTPATIENT
Start: 2024-10-13 | End: 2024-10-16

## 2024-10-13 RX ORDER — OXYCODONE HYDROCHLORIDE 30 MG/1
5 TABLET, FILM COATED, EXTENDED RELEASE ORAL ONCE
Refills: 0 | Status: DISCONTINUED | OUTPATIENT
Start: 2024-10-13 | End: 2024-10-16

## 2024-10-13 RX ORDER — PIPERACILLIN SODIUM AND TAZOBACTAM SODIUM 12; 1.5 G/60ML; G/60ML
4.5 INJECTION, POWDER, LYOPHILIZED, FOR SOLUTION INTRAVENOUS EVERY 8 HOURS
Refills: 0 | Status: DISCONTINUED | OUTPATIENT
Start: 2024-10-13 | End: 2024-10-13

## 2024-10-13 RX ORDER — TETANUS TOXOID, REDUCED DIPHTHERIA TOXOID AND ACELLULAR PERTUSSIS VACCINE, ADSORBED 5; 2.5; 8; 8; 2.5 [IU]/.5ML; [IU]/.5ML; UG/.5ML; UG/.5ML; UG/.5ML
0.5 SUSPENSION INTRAMUSCULAR ONCE
Refills: 0 | Status: DISCONTINUED | OUTPATIENT
Start: 2024-10-13 | End: 2024-10-16

## 2024-10-13 RX ORDER — ONDANSETRON HCL/PF 4 MG/2 ML
4 VIAL (ML) INJECTION ONCE
Refills: 0 | Status: COMPLETED | OUTPATIENT
Start: 2024-10-13 | End: 2024-10-13

## 2024-10-13 RX ORDER — SODIUM CHLORIDE IRRIG SOLUTION 0.9 %
1000 SOLUTION, IRRIGATION IRRIGATION
Refills: 0 | Status: DISCONTINUED | OUTPATIENT
Start: 2024-10-13 | End: 2024-10-16

## 2024-10-13 RX ORDER — ACETAMINOPHEN 325 MG
1000 TABLET ORAL ONCE
Refills: 0 | Status: COMPLETED | OUTPATIENT
Start: 2024-10-13 | End: 2024-10-13

## 2024-10-13 RX ORDER — OXYCODONE HYDROCHLORIDE 30 MG/1
5 TABLET, FILM COATED, EXTENDED RELEASE ORAL
Refills: 0 | Status: COMPLETED | OUTPATIENT
Start: 2024-10-13 | End: 2024-10-20

## 2024-10-13 RX ORDER — DIPHENHYDRAMINE HCL 12.5MG/5ML
25 LIQUID (ML) ORAL EVERY 6 HOURS
Refills: 0 | Status: DISCONTINUED | OUTPATIENT
Start: 2024-10-13 | End: 2024-10-16

## 2024-10-13 RX ORDER — OXYTOCIN/RINGER'S LACTATE 20/500ML
42 PLASTIC BAG, INJECTION (ML) INTRAVENOUS
Qty: 30 | Refills: 0 | Status: DISCONTINUED | OUTPATIENT
Start: 2024-10-13 | End: 2024-10-16

## 2024-10-13 RX ORDER — PIPERACILLIN SODIUM AND TAZOBACTAM SODIUM 12; 1.5 G/60ML; G/60ML
4.5 INJECTION, POWDER, LYOPHILIZED, FOR SOLUTION INTRAVENOUS ONCE
Refills: 0 | Status: COMPLETED | OUTPATIENT
Start: 2024-10-13 | End: 2024-10-13

## 2024-10-13 RX ORDER — MAGNESIUM HYDROXIDE 400 MG/5ML
30 SUSPENSION, ORAL (FINAL DOSE FORM) ORAL
Refills: 0 | Status: DISCONTINUED | OUTPATIENT
Start: 2024-10-13 | End: 2024-10-16

## 2024-10-13 RX ADMIN — Medication 975 MILLIGRAM(S): at 14:59

## 2024-10-13 RX ADMIN — Medication 975 MILLIGRAM(S): at 20:43

## 2024-10-13 RX ADMIN — KETOROLAC TROMETHAMINE 30 MILLIGRAM(S): 10 TABLET, FILM COATED ORAL at 12:15

## 2024-10-13 RX ADMIN — PIPERACILLIN SODIUM AND TAZOBACTAM SODIUM 200 GRAM(S): 12; 1.5 INJECTION, POWDER, LYOPHILIZED, FOR SOLUTION INTRAVENOUS at 07:56

## 2024-10-13 RX ADMIN — KETOROLAC TROMETHAMINE 30 MILLIGRAM(S): 10 TABLET, FILM COATED ORAL at 23:53

## 2024-10-13 RX ADMIN — Medication 4 MILLIGRAM(S): at 04:57

## 2024-10-13 RX ADMIN — Medication 1000 MILLIGRAM(S): at 08:00

## 2024-10-13 RX ADMIN — PIPERACILLIN SODIUM AND TAZOBACTAM SODIUM 200 GRAM(S): 12; 1.5 INJECTION, POWDER, LYOPHILIZED, FOR SOLUTION INTRAVENOUS at 23:52

## 2024-10-13 RX ADMIN — Medication 167 MILLIUNIT(S)/MIN: at 07:00

## 2024-10-13 RX ADMIN — KETOROLAC TROMETHAMINE 30 MILLIGRAM(S): 10 TABLET, FILM COATED ORAL at 18:08

## 2024-10-13 RX ADMIN — Medication 5000 UNIT(S): at 16:02

## 2024-10-13 RX ADMIN — Medication 975 MILLIGRAM(S): at 15:30

## 2024-10-13 RX ADMIN — Medication 2 MILLIUNIT(S)/MIN: at 01:06

## 2024-10-13 RX ADMIN — Medication 975 MILLIGRAM(S): at 21:30

## 2024-10-13 RX ADMIN — KETOROLAC TROMETHAMINE 30 MILLIGRAM(S): 10 TABLET, FILM COATED ORAL at 18:38

## 2024-10-13 RX ADMIN — PIPERACILLIN SODIUM AND TAZOBACTAM SODIUM 200 GRAM(S): 12; 1.5 INJECTION, POWDER, LYOPHILIZED, FOR SOLUTION INTRAVENOUS at 16:03

## 2024-10-13 RX ADMIN — KETOROLAC TROMETHAMINE 30 MILLIGRAM(S): 10 TABLET, FILM COATED ORAL at 12:50

## 2024-10-13 RX ADMIN — Medication 400 MILLIGRAM(S): at 07:23

## 2024-10-13 NOTE — OB NEONATOLOGY/PEDIATRICIAN DELIVERY SUMMARY - NSPEDSNEONOTESA_OBGYN_ALL_OB_FT
Called by OB to attend  c/s delivery due to cat II tracing. Baby is a product of a 39.4 week gestation born to a  35 yo F.  Maternal labs include Blood Type  A+  , HIV NR , RPR NR , Hep B- , GBS neg , rubella immune, hep C pending. Maternal history is significant for hx of shingles (2018), anxiety. Pregnancy was complicated by  PROM. Failed IOL. ROM at  0800 10/12 , approximately  22 hrs prior delivery AF clear.  Resuscitation included d/s/s. Apgars were: 7/9 . EOS score 0.35. Admit to WBN . Called by OB to attend  c/s delivery due to cat II tracing. Baby is a product of a 39.4 week gestation born to a  35 yo F.  Maternal labs include Blood Type  A+  , HIV NR , RPR NR , Hep B- , GBS neg , rubella immune, hep C pending. Maternal history is significant for hx of shingles (2018), anxiety. Pregnancy was complicated by premature ROM. Failed IOL. ROM at  0800 10/12 , approximately  22 hrs prior delivery AF clear.  Resuscitation included d/s/s. Apgars were: 7/9 . EOS score 0.35. Admit to WBN .

## 2024-10-13 NOTE — OB PROVIDER DELIVERY SUMMARY - NSSELHIDDEN_OBGYN_ALL_OB_FT
[NS_DeliveryAttending1_OBGYN_ALL_OB_FT:[id number]],[NS_DeliveryAssist1_OBGYN_ALL_OB_FT:[id number]],[NS_DeliveryAssist2_OBGYN_ALL_OB_FT:[id number]=]

## 2024-10-13 NOTE — OB PROVIDER LABOR PROGRESS NOTE - NS_SUBJECTIVE/OBJECTIVE_OBGYN_ALL_OB_FT
Pt seen for placement of cervical tijerina balloon with sterile techniques; 60cc NS instilled into uterine/vaginal balloons; placed w/o incidence
Pt seen and examined for prolonged deceleration
VE due to intermittent early and variable decelerations
Patient seen and examined at bedside with recurrent variable decelerations

## 2024-10-13 NOTE — OB RN DELIVERY SUMMARY - NS_LABORINFO_OBGYN_ALL_OB
Ok to stop CIpro, reculture urine when she is done with Augmentin. Prolonged rupture of membranes (>12h)/PROM (prior to onset of labor)

## 2024-10-13 NOTE — OB RN INTRAOPERATIVE NOTE - NSSELHIDDEN_OBGYN_ALL_OB_FT
[NS_DeliveryAttending1_OBGYN_ALL_OB_FT:[id number]],[NS_DeliveryAssist1_OBGYN_ALL_OB_FT:[id number]],[NS_DeliveryAssist2_OBGYN_ALL_OB_FT:[id number]=],[NS_DeliveryRN_OBGYN_ALL_OB_FT:[id number]=]

## 2024-10-13 NOTE — OB PROVIDER DELIVERY SUMMARY - NSPROVIDERDELIVERYNOTE_OBGYN_ALL_OB_FT
Procedure: pLTCS  Preop Dx: category 2 tracing, IUP @39w4d  QBL: 832  IVF:  2L crystalloids  UOP: 275 ml  Layers of uterine closure: 1, Vicryl oversewn with multiple figure of eights using Vicryl  Rectus muscle reapproximated with chromic suture  Complications: extension of L hysterotomy angle  Specimen: none   Findings: grossly nl uterus, fallopian tubes, and ovaries  Hemostatic/Intraoperative agents: Surgicel powder  Baby: F Apgars 7/8, 2815 g    Dictation: [id number]    Mamta Triana MD  OBGYN PGY2 Procedure: pLTCS  Preop Dx: category 2 tracing, IUP @39w4d  QBL: 832  IVF:  2L crystalloids  UOP: 275 ml  Layers of uterine closure: 1, Vicryl oversewn with multiple figure of eights using Vicryl  Rectus muscle reapproximated with chromic suture  Complications: 3cm extension of L hysterotomy angle  Specimen: none   Findings: grossly nl uterus, fallopian tubes, and ovaries  Hemostatic/Intraoperative agents: Surgicel powder  Baby: F Apgars 7/8, 2815 g    Dictation: [id number]    Mamta Triana MD  OBGYN PGY2

## 2024-10-13 NOTE — OB RN DELIVERY SUMMARY - NSSELHIDDEN_OBGYN_ALL_OB_FT
alert and awake/follows commands [NS_DeliveryAttending1_OBGYN_ALL_OB_FT:[id number]],[NS_DeliveryAssist1_OBGYN_ALL_OB_FT:[id number]=],[NS_DeliveryAssist2_OBGYN_ALL_OB_FT:[id number]=],[NS_DeliveryRN_OBGYN_ALL_OB_FT:[id number]=]

## 2024-10-13 NOTE — OB PROVIDER LABOR PROGRESS NOTE - NS_OBIHIFHRDETAILS_OBGYN_ALL_OB_FT
130, moderate, +accels, no decels
Category II  Baseline: 140s/moderate variability, (periods of minimal variability) recurrent variable decelerations w/ >50% of contractions
Baseline 140, mod variability, - accels, +6min prolonged decel

## 2024-10-13 NOTE — OB PROVIDER LABOR PROGRESS NOTE - ASSESSMENT
34y  @39w4d admitted for IOL for PROM (10/12 @0800), s/p cytotec x2 and CRB, with persistent category II FHR tracing.    -Multiple resuscitative measures have been attempted including maternal repositioning, IV fluid bolus, amnioinfusion  -Discussed with patient that given persistent category II FHR tracing cannot transition to pitocin for continuation of IOL despite resuscitative measures  -Discussed management options including expectant management vs primary  section. Counseled patient that given persistent category II tracing, ROM >20h and dilation remote from delivery would recommend primary  section.  -Risks of c/s including bleeding, infection and injury discussed, all questions answered  -Patient amenable to proceeding with pCS  -Anesthesia team informed, will proceed to OR  
34y G1 @39w3d admitted for IOL    - Pt repositioned and LR bolus administered  - Tracing resuscitated  - Continue cont EFM, toco, IVF  - Transition to pitocin for IOL when able    Mamta Triana PGY2
A/P:  -EFM: resuscitative measures prn  -continue buccal cytotec  -anesthesia consult for epidural prn  -anticipate   
- cervical balloon expelled and sitting in vagina. Removed.  - patient is comfortable with epidural   - will dc cytotec  - will start pitocin when next due, around 930p   - FHT improved with maternal repositioning   - Will give 500cc bolus as well for resuscitation     Deanna Sargent, PGY4  d/w Dr. Goldman

## 2024-10-13 NOTE — OB RN DELIVERY SUMMARY - NS_SEPSISRSKCALC_OBGYN_ALL_OB_FT
EOS calculated successfully. EOS Risk Factor: 0.5/1000 live births ([facility] national incidence); GA=39w4d; Temp=99.32; ROM=21.567; GBS='Negative'; Antibiotics='No antibiotics or any antibiotics < 2 hrs prior to birth'

## 2024-10-14 LAB
BASOPHILS # BLD AUTO: 0.08 K/UL — SIGNIFICANT CHANGE UP (ref 0–0.2)
BASOPHILS NFR BLD AUTO: 0.4 % — SIGNIFICANT CHANGE UP (ref 0–2)
EOSINOPHIL # BLD AUTO: 0.08 K/UL — SIGNIFICANT CHANGE UP (ref 0–0.5)
EOSINOPHIL NFR BLD AUTO: 0.4 % — SIGNIFICANT CHANGE UP (ref 0–6)
HCT VFR BLD CALC: 25.2 % — LOW (ref 34.5–45)
HCV RNA SPEC NAA+PROBE-LOG IU: SIGNIFICANT CHANGE UP
HCV RNA SPEC NAA+PROBE-LOG IU: SIGNIFICANT CHANGE UP LOGIU/ML
HGB BLD-MCNC: 8.1 G/DL — LOW (ref 11.5–15.5)
IMM GRANULOCYTES NFR BLD AUTO: 0.8 % — SIGNIFICANT CHANGE UP (ref 0–0.9)
LYMPHOCYTES # BLD AUTO: 1.87 K/UL — SIGNIFICANT CHANGE UP (ref 1–3.3)
LYMPHOCYTES # BLD AUTO: 9.3 % — LOW (ref 13–44)
MCHC RBC-ENTMCNC: 30.1 PG — SIGNIFICANT CHANGE UP (ref 27–34)
MCHC RBC-ENTMCNC: 32.1 GM/DL — SIGNIFICANT CHANGE UP (ref 32–36)
MCV RBC AUTO: 93.7 FL — SIGNIFICANT CHANGE UP (ref 80–100)
MONOCYTES # BLD AUTO: 0.86 K/UL — SIGNIFICANT CHANGE UP (ref 0–0.9)
MONOCYTES NFR BLD AUTO: 4.3 % — SIGNIFICANT CHANGE UP (ref 2–14)
NEUTROPHILS # BLD AUTO: 17.06 K/UL — HIGH (ref 1.8–7.4)
NEUTROPHILS NFR BLD AUTO: 84.8 % — HIGH (ref 43–77)
NRBC # BLD: 0 /100 WBCS — SIGNIFICANT CHANGE UP (ref 0–0)
PLATELET # BLD AUTO: 195 K/UL — SIGNIFICANT CHANGE UP (ref 150–400)
RBC # BLD: 2.69 M/UL — LOW (ref 3.8–5.2)
RBC # FLD: 13.8 % — SIGNIFICANT CHANGE UP (ref 10.3–14.5)
WBC # BLD: 20.12 K/UL — HIGH (ref 3.8–10.5)
WBC # FLD AUTO: 20.12 K/UL — HIGH (ref 3.8–10.5)

## 2024-10-14 PROCEDURE — 88307 TISSUE EXAM BY PATHOLOGIST: CPT | Mod: 26

## 2024-10-14 RX ORDER — PIPERACILLIN SODIUM AND TAZOBACTAM SODIUM 12; 1.5 G/60ML; G/60ML
4.5 INJECTION, POWDER, LYOPHILIZED, FOR SOLUTION INTRAVENOUS EVERY 8 HOURS
Refills: 0 | Status: COMPLETED | OUTPATIENT
Start: 2024-10-14 | End: 2024-10-15

## 2024-10-14 RX ORDER — OXYCODONE HYDROCHLORIDE 30 MG/1
5 TABLET, FILM COATED, EXTENDED RELEASE ORAL
Refills: 0 | Status: DISCONTINUED | OUTPATIENT
Start: 2024-10-14 | End: 2024-10-16

## 2024-10-14 RX ORDER — PIPERACILLIN SODIUM AND TAZOBACTAM SODIUM 12; 1.5 G/60ML; G/60ML
4.5 INJECTION, POWDER, LYOPHILIZED, FOR SOLUTION INTRAVENOUS EVERY 8 HOURS
Refills: 0 | Status: DISCONTINUED | OUTPATIENT
Start: 2024-10-14 | End: 2024-10-14

## 2024-10-14 RX ORDER — CYCLOBENZAPRINE HCL 10 MG
5 TABLET ORAL ONCE
Refills: 0 | Status: COMPLETED | OUTPATIENT
Start: 2024-10-14 | End: 2024-10-14

## 2024-10-14 RX ADMIN — Medication 600 MILLIGRAM(S): at 11:52

## 2024-10-14 RX ADMIN — Medication 975 MILLIGRAM(S): at 03:53

## 2024-10-14 RX ADMIN — KETOROLAC TROMETHAMINE 30 MILLIGRAM(S): 10 TABLET, FILM COATED ORAL at 00:45

## 2024-10-14 RX ADMIN — Medication 975 MILLIGRAM(S): at 15:09

## 2024-10-14 RX ADMIN — Medication 975 MILLIGRAM(S): at 04:44

## 2024-10-14 RX ADMIN — PIPERACILLIN SODIUM AND TAZOBACTAM SODIUM 200 GRAM(S): 12; 1.5 INJECTION, POWDER, LYOPHILIZED, FOR SOLUTION INTRAVENOUS at 23:44

## 2024-10-14 RX ADMIN — Medication 975 MILLIGRAM(S): at 15:40

## 2024-10-14 RX ADMIN — Medication 975 MILLIGRAM(S): at 21:45

## 2024-10-14 RX ADMIN — PIPERACILLIN SODIUM AND TAZOBACTAM SODIUM 200 GRAM(S): 12; 1.5 INJECTION, POWDER, LYOPHILIZED, FOR SOLUTION INTRAVENOUS at 08:20

## 2024-10-14 RX ADMIN — Medication 600 MILLIGRAM(S): at 23:44

## 2024-10-14 RX ADMIN — KETOROLAC TROMETHAMINE 30 MILLIGRAM(S): 10 TABLET, FILM COATED ORAL at 05:36

## 2024-10-14 RX ADMIN — Medication 600 MILLIGRAM(S): at 17:48

## 2024-10-14 RX ADMIN — PIPERACILLIN SODIUM AND TAZOBACTAM SODIUM 200 GRAM(S): 12; 1.5 INJECTION, POWDER, LYOPHILIZED, FOR SOLUTION INTRAVENOUS at 16:00

## 2024-10-14 RX ADMIN — KETOROLAC TROMETHAMINE 30 MILLIGRAM(S): 10 TABLET, FILM COATED ORAL at 06:30

## 2024-10-14 RX ADMIN — Medication 5000 UNIT(S): at 17:53

## 2024-10-14 RX ADMIN — Medication 975 MILLIGRAM(S): at 20:50

## 2024-10-14 RX ADMIN — Medication 600 MILLIGRAM(S): at 12:30

## 2024-10-14 RX ADMIN — Medication 600 MILLIGRAM(S): at 18:20

## 2024-10-14 RX ADMIN — Medication 975 MILLIGRAM(S): at 09:50

## 2024-10-14 RX ADMIN — Medication 975 MILLIGRAM(S): at 09:15

## 2024-10-14 RX ADMIN — Medication 5000 UNIT(S): at 05:36

## 2024-10-14 NOTE — PROGRESS NOTE ADULT - ATTENDING COMMENTS
OB attending     patient seen and examined, agree with above and following  33y/o  POD#1 from \[facility]\"" for Cat 2 tracings. Hx significant for gHTN and endometritis.  Reports pain today, improved with medication (acetaminophen/ibuprofen)  +BM, voiding and ambulating, tolerating reg diet     Vital Signs Last 24 Hrs  T(C): 36.5 (14 Oct 2024 09:21), Max: 37 (13 Oct 2024 14:30)  T(F): 97.7 (14 Oct 2024 09:21), Max: 98.6 (13 Oct 2024 14:30)  HR: 88 (14 Oct 2024 09:21) (76 - 88)  BP: 102/68 (14 Oct 2024 09:21) (96/63 - 102/68)  BP(mean): --  RR: 18 (14 Oct 2024 09:21) (18 - 18)  SpO2: 97% (14 Oct 2024 09:21) (97% - 99%)    Parameters below as of 14 Oct 2024 09:21  Patient On (Oxygen Delivery Method): room air    NAD   incision c/d/i  +fundal tenderness  ext nontender                           8.1    20.12 )-----------( 195      ( 14 Oct 2024 06:39 )             25.2       PLAN:   -continue zosyn for 48 hour course due to tenderness, WBC count 20 today   -continue to monitor bps, wnl the last 24h  -plan of care discussed with patient and nursing team     Teresita Avalos MD

## 2024-10-14 NOTE — PROGRESS NOTE ADULT - ASSESSMENT
35y/o  POD#1 from [facility] for Cat 2 tracings. Hx significant for gHTN and endometritis. Patient is currently meeting postpartum milestones.     #gHTN  - BPs stable overnight, 90s/60s, continue to monitor BPs    #Endometritis:   - Zosyn x24 hours (10/13-10/14)  - Afebrile overnight, continue to monitor for temp    #Postpartum  - Continue with po analgesia  - Increase ambulation  - Continue regular diet  - AM CBC   - Incision site c/d/i    Estefani Ken PGY1 35y/o  POD#1 from [facility] for Cat 2 tracings. Hx significant for gHTN and endometritis. Patient is currently meeting postpartum milestones.     #gHTN  - BPs stable overnight, 90s/60s, continue to monitor BPs    #Endometritis:   - Zosyn x24 hours (10/13-10/15)  - Afebrile overnight, continue to monitor for temp    #Postpartum  - Continue with po analgesia  - Increase ambulation  - Continue regular diet  - AM CBC   - Incision site c/d/i    Estefani Ken PGY1

## 2024-10-15 LAB
HCT VFR BLD CALC: 28.2 % — LOW (ref 34.5–45)
HGB BLD-MCNC: 8.9 G/DL — LOW (ref 11.5–15.5)
MCHC RBC-ENTMCNC: 29.7 PG — SIGNIFICANT CHANGE UP (ref 27–34)
MCHC RBC-ENTMCNC: 31.6 GM/DL — LOW (ref 32–36)
MCV RBC AUTO: 94 FL — SIGNIFICANT CHANGE UP (ref 80–100)
NRBC # BLD: 0 /100 WBCS — SIGNIFICANT CHANGE UP (ref 0–0)
PLATELET # BLD AUTO: 250 K/UL — SIGNIFICANT CHANGE UP (ref 150–400)
RBC # BLD: 3 M/UL — LOW (ref 3.8–5.2)
RBC # FLD: 14.1 % — SIGNIFICANT CHANGE UP (ref 10.3–14.5)
WBC # BLD: 17.16 K/UL — HIGH (ref 3.8–10.5)
WBC # FLD AUTO: 17.16 K/UL — HIGH (ref 3.8–10.5)

## 2024-10-15 RX ADMIN — Medication 80 MILLIGRAM(S): at 05:48

## 2024-10-15 RX ADMIN — Medication 975 MILLIGRAM(S): at 09:18

## 2024-10-15 RX ADMIN — Medication 600 MILLIGRAM(S): at 12:08

## 2024-10-15 RX ADMIN — Medication 975 MILLIGRAM(S): at 03:54

## 2024-10-15 RX ADMIN — PIPERACILLIN SODIUM AND TAZOBACTAM SODIUM 200 GRAM(S): 12; 1.5 INJECTION, POWDER, LYOPHILIZED, FOR SOLUTION INTRAVENOUS at 08:48

## 2024-10-15 RX ADMIN — Medication 975 MILLIGRAM(S): at 03:04

## 2024-10-15 RX ADMIN — Medication 975 MILLIGRAM(S): at 21:10

## 2024-10-15 RX ADMIN — Medication 600 MILLIGRAM(S): at 17:39

## 2024-10-15 RX ADMIN — Medication 600 MILLIGRAM(S): at 11:38

## 2024-10-15 RX ADMIN — Medication 600 MILLIGRAM(S): at 00:30

## 2024-10-15 RX ADMIN — Medication 975 MILLIGRAM(S): at 20:40

## 2024-10-15 RX ADMIN — Medication 5000 UNIT(S): at 05:47

## 2024-10-15 RX ADMIN — Medication 600 MILLIGRAM(S): at 06:40

## 2024-10-15 RX ADMIN — Medication 600 MILLIGRAM(S): at 05:48

## 2024-10-15 RX ADMIN — Medication 975 MILLIGRAM(S): at 08:48

## 2024-10-15 RX ADMIN — Medication 975 MILLIGRAM(S): at 15:10

## 2024-10-15 RX ADMIN — Medication 5000 UNIT(S): at 17:39

## 2024-10-15 NOTE — PROGRESS NOTE ADULT - ASSESSMENT
33y/o  POD#2 from p[facility]CS for Cat 2 tracings. Hx significant for gHTN and endometritis. Patient is currently meeting postpartum milestones.     #gHTN  - BPs stable overnight, 90-110s/60-70s, continue to monitor BPs    #Endometritis:   - Zosyn x24 hours (10/13-10/15)  - F/u WBC this AM  - Afebrile overnight, continue to monitor for temp    #Postpartum  - Continue with po analgesia  - Increase ambulation  - Continue regular diet  - H&H stable   - Incision site c/d/i    Estefani Ken PGY1

## 2024-10-15 NOTE — PROGRESS NOTE ADULT - ATTENDING COMMENTS
35 yo POD#2 s/p PCS c/b gHTN and chorioamnionitis. Pt endorses continued abdominal pain that has improved. She also c/o headache. BPs overall well controlled without antihypertensive medications. Recommend re-evaluating HA following next dose of acetaminophen. Pt remains afebrile. Continue IV zosyn for 48 hours. Continue current management.

## 2024-10-16 ENCOUNTER — TRANSCRIPTION ENCOUNTER (OUTPATIENT)
Age: 34
End: 2024-10-16

## 2024-10-16 VITALS
RESPIRATION RATE: 18 BRPM | SYSTOLIC BLOOD PRESSURE: 118 MMHG | OXYGEN SATURATION: 98 % | DIASTOLIC BLOOD PRESSURE: 64 MMHG | TEMPERATURE: 98 F | HEART RATE: 82 BPM

## 2024-10-16 PROCEDURE — 84550 ASSAY OF BLOOD/URIC ACID: CPT

## 2024-10-16 PROCEDURE — 86780 TREPONEMA PALLIDUM: CPT

## 2024-10-16 PROCEDURE — 85025 COMPLETE CBC W/AUTO DIFF WBC: CPT

## 2024-10-16 PROCEDURE — 86803 HEPATITIS C AB TEST: CPT

## 2024-10-16 PROCEDURE — 86901 BLOOD TYPING SEROLOGIC RH(D): CPT

## 2024-10-16 PROCEDURE — 59025 FETAL NON-STRESS TEST: CPT

## 2024-10-16 PROCEDURE — 88307 TISSUE EXAM BY PATHOLOGIST: CPT

## 2024-10-16 PROCEDURE — C1889: CPT

## 2024-10-16 PROCEDURE — 86900 BLOOD TYPING SEROLOGIC ABO: CPT

## 2024-10-16 PROCEDURE — 87522 HEPATITIS C REVRS TRNSCRPJ: CPT

## 2024-10-16 PROCEDURE — 85027 COMPLETE CBC AUTOMATED: CPT

## 2024-10-16 PROCEDURE — 59050 FETAL MONITOR W/REPORT: CPT

## 2024-10-16 PROCEDURE — 36415 COLL VENOUS BLD VENIPUNCTURE: CPT

## 2024-10-16 PROCEDURE — 86850 RBC ANTIBODY SCREEN: CPT

## 2024-10-16 PROCEDURE — 83615 LACTATE (LD) (LDH) ENZYME: CPT

## 2024-10-16 PROCEDURE — 82962 GLUCOSE BLOOD TEST: CPT

## 2024-10-16 PROCEDURE — 80053 COMPREHEN METABOLIC PANEL: CPT

## 2024-10-16 RX ORDER — PRENATAL VIT,CAL 76/IRON/FOLIC 29 MG-1 MG
1 TABLET ORAL
Qty: 0 | Refills: 0 | DISCHARGE

## 2024-10-16 RX ORDER — ACETAMINOPHEN 325 MG
2 TABLET ORAL
Qty: 0 | Refills: 0 | DISCHARGE
Start: 2024-10-16

## 2024-10-16 RX ADMIN — Medication 975 MILLIGRAM(S): at 16:55

## 2024-10-16 RX ADMIN — Medication 80 MILLIGRAM(S): at 14:49

## 2024-10-16 RX ADMIN — Medication 975 MILLIGRAM(S): at 03:00

## 2024-10-16 RX ADMIN — Medication 600 MILLIGRAM(S): at 00:55

## 2024-10-16 RX ADMIN — Medication 975 MILLIGRAM(S): at 10:20

## 2024-10-16 RX ADMIN — Medication 400 MILLIGRAM(S): at 06:54

## 2024-10-16 RX ADMIN — Medication 600 MILLIGRAM(S): at 00:25

## 2024-10-16 RX ADMIN — Medication 400 MILLIGRAM(S): at 06:26

## 2024-10-16 RX ADMIN — Medication 400 MILLIGRAM(S): at 12:04

## 2024-10-16 RX ADMIN — Medication 80 MILLIGRAM(S): at 00:25

## 2024-10-16 RX ADMIN — Medication 975 MILLIGRAM(S): at 16:08

## 2024-10-16 RX ADMIN — Medication 400 MILLIGRAM(S): at 12:50

## 2024-10-16 RX ADMIN — Medication 975 MILLIGRAM(S): at 03:30

## 2024-10-16 RX ADMIN — Medication 975 MILLIGRAM(S): at 09:33

## 2024-10-16 NOTE — PROGRESS NOTE ADULT - ASSESSMENT
35y/o  POD#3 from [facility] for Cat 2 tracings. Hx significant for gHTN and endometritis. Patient is meeting postpartum milestones.     #gHTN  - BPs stable overnight, 100-110s/70s, continue to monitor BPs    #Endometritis:   - Zosyn x24 hours (10/13-10/15)  - WBC downtrending   - Afebrile overnight, continue to monitor for temp    #Postpartum  - Continue with po analgesia  - Increase ambulation  - Continue regular diet  - H&H stable   - Incision site c/d/i    Estefani Ken PGY1

## 2024-10-16 NOTE — DISCHARGE NOTE OB - MEDICATION SUMMARY - MEDICATIONS TO STOP TAKING
I will STOP taking the medications listed below when I get home from the hospital:    Zofran ODT 4 mg oral tablet, disintegrating  -- 1 tab(s) by mouth every 8 hours, As Needed -for nausea

## 2024-10-16 NOTE — DISCHARGE NOTE OB - CARE PROVIDER_API CALL
Verito Colvin  Obstetrics and Gynecology  [address], [address]  [place] [address]  Phone: [phone]  Fax: [phone]  Follow Up Time:

## 2024-10-16 NOTE — DISCHARGE NOTE OB - CARE PLAN
Principal Discharge DX:	Single delivery by  section  Assessment and plan of treatment:	nothing per vagina., no heavy lifting  pain meds and monitoring reviewed   1

## 2024-10-16 NOTE — DISCHARGE NOTE OB - FINANCIAL ASSISTANCE
[facility] provides services at a reduced cost to those who are determined to be eligible through [facility]’s financial assistance program. Information regarding [facility]’s financial assistance program can be found by going to [email or web address] or by calling [phone].

## 2024-10-16 NOTE — DISCHARGE NOTE OB - PATIENT PORTAL LINK FT
You can access the FollowMyHealth Patient Portal offered by [facility] by registering at the following website: [email or web address]. By joining [facility]’s FollowMyHealth portal, you will also be able to view your health information using other applications (apps) compatible with our system.

## 2024-10-16 NOTE — DISCHARGE NOTE OB - MEDICATION SUMMARY - MEDICATIONS TO TAKE
I will START or STAY ON the medications listed below when I get home from the hospital:    ibuprofen 600 mg oral tablet  -- 1 by mouth every 6 hours  -- Indication: For Section    acetaminophen 325 mg oral tablet  -- 2 tab(s) by mouth every 6 hours  -- Indication: For Section    multivitamin, prenatal  -- 1 once a day  -- Indication: For Section

## 2024-10-16 NOTE — PROGRESS NOTE ADULT - SUBJECTIVE AND OBJECTIVE BOX
Day 1 of Anesthesia Pain Management Service    SUBJECTIVE: Doing ok  Pain Scale Score:          [X] Refer to charted pain scores    THERAPY:  s/p   2  mg PF epidural morphine on 10\13\24      MEDICATIONS  (STANDING):  acetaminophen     Tablet .. 975 milliGRAM(s) Oral <User Schedule>  diphtheria/tetanus/pertussis (acellular) Vaccine (Adacel) 0.5 milliLiter(s) IntraMuscular once  heparin   Injectable 5000 Unit(s) SubCutaneous every 12 hours  ibuprofen  Tablet. 600 milliGRAM(s) Oral every 6 hours  influenza   Vaccine 0.5 milliLiter(s) IntraMuscular once  lactated ringers Bolus 1000 milliLiter(s) IV Bolus once  lactated ringers. 1000 milliLiter(s) (125 mL/Hr) IV Continuous <Continuous>  oxytocin Infusion 42 milliUNIT(s)/Min (42 mL/Hr) IV Continuous <Continuous>  oxytocin Infusion. 4 milliUNIT(s)/Min (4 mL/Hr) IV Continuous <Continuous>  sodium chloride 0.9%. 1000 milliLiter(s) (125 mL/Hr) IV Continuous <Continuous>    MEDICATIONS  (PRN):  diphenhydrAMINE 25 milliGRAM(s) Oral every 6 hours PRN Pruritus  lanolin Ointment 1 Application(s) Topical every 6 hours PRN Sore Nipples  magnesium hydroxide Suspension 30 milliLiter(s) Oral two times a day PRN Constipation  oxyCODONE    IR 5 milliGRAM(s) Oral every 3 hours PRN Moderate to Severe Pain (4-10)  oxyCODONE    IR 5 milliGRAM(s) Oral once PRN Moderate to Severe Pain (4-10)  simethicone 80 milliGRAM(s) Chew every 4 hours PRN Gas      OBJECTIVE:    Sedation:        	[X] Alert	 [ ] Drowsy	[ ] Arousable      [ ] Asleep       [ ] Unresponsive    Side Effects:	[X] None 	[ ] Nausea	[ ] Vomiting         [ ] Pruritus  		[ ] Weakness            [ ] Numbness	          [ ] Other:    Vital Signs Last 24 Hrs  T(C): 36.5 (14 Oct 2024 09:21), Max: 37 (13 Oct 2024 14:30)  T(F): 97.7 (14 Oct 2024 09:21), Max: 98.6 (13 Oct 2024 14:30)  HR: 88 (14 Oct 2024 09:21) (76 - 88)  BP: 102/68 (14 Oct 2024 09:21) (96/63 - 102/68)  BP(mean): --  RR: 18 (14 Oct 2024 09:21) (18 - 18)  SpO2: 97% (14 Oct 2024 09:21) (97% - 99%)    Parameters below as of 14 Oct 2024 09:21  Patient On (Oxygen Delivery Method): room air        ASSESSMENT/ PLAN  [X] Patient transitioned to prn analgesics  [X] Pain management per primary service, pain service to sign off   [X]Documentation and Verification of current medications
OB Attending Note      S: Pt feeling well, +F, pain controlled    Physical exam:    Vital Signs Last 24 Hrs  T(C): 36.9 (16 Oct 2024 08:57), Max: 37.1 (15 Oct 2024 14:08)  T(F): 98.4 (16 Oct 2024 08:57), Max: 98.7 (15 Oct 2024 14:08)  HR: 82 (16 Oct 2024 08:57) (82 - 89)  BP: 118/64 (16 Oct 2024 08:57) (108/70 - 118/79)      I&O's Summary      Gen: NAD  Breast: Soft, nontender, not engorged.  Abdomen: Soft, nontender, no distension , firm uterine fundus at umbilicus.  Incision: Clean, dry, and intact with tape  Scant Lochia  Ext: No calf tenderness    LABS:                        8.9    17.16 )-----------( 250      ( 15 Oct 2024 06:49 )             28.2                         Assessment and Plan  POD #3 s/p  section  Doing well.  Discharge instructions reviewed, all questions answered, Pain meds with NSAIDs/narcotics reviewed, ambulation/ nothing per vagina, no lifting or exercise, f/u 2 weeks for a post op check  no fundal tenderness - no current signs of endometritis s/p zosyn for chorio          
Day 1 of Anesthesia Pain Management Service    SUBJECTIVE:  Pain Scale Score:          [X] Refer to charted pain scores    THERAPY: Received PF spinal morphine as above    OBJECTIVE:    Sedation:        	[X] Alert	[ ] Drowsy	[ ] Arousable      [ ] Asleep       [ ] Unresponsive    Side Effects:	[X] None	[ ] Nausea	[ ] Vomiting         [ ] Pruritus  		[ ] Weakness            [ ] Numbness	          [ ] Other:    ASSESSMENT/ PLAN  [X] Patient transitioned to prn analgesics  [X] Pain management per primary service, pain service to sign off   [X]Documentation and Verification of current medications
R1 Progress Note    Patient seen and examined at bedside, no acute overnight events. No acute complaints, pain well controlled. Patient is ambulating and tolerating regular diet. Pt has passed flatus and is voiding spontaneously. Denies fever, chills, CP, SOB, N/V, HA, blurred vision.     Vital Signs Last 24 Hours  T(C): 36.7 (10-15-24 @ 21:10), Max: 37.1 (10-15-24 @ 14:08)  HR: 84 (10-15-24 @ 21:10) (80 - 89)  BP: 108/75 (10-15-24 @ 21:10) (99/69 - 118/79)  RR: 18 (10-15-24 @ 21:10) (18 - 18)  SpO2: 99% (10-15-24 @ 17:45) (98% - 99%)    I&O's Summary    General: NAD  Pulm: breathing comfortably on room air   Abdomen: Soft, appropriately-tender, mildly-distended, fundus firm  Incision: Pfannenstiel incision CDI  Pelvic: Lochia wnl    Labs:    Blood Type: A Positive  Antibody Screen: Negative  RPR: Negative               8.9    17.16 )-----------( 250      ( 10-15 @ 06:49 )             28.2                8.1    20.12 )-----------( 195      ( 10-14 @ 06:39 )             25.2                10.1   19.46 )-----------( 184      ( 10-13 @ 07:38 )             31.0         MEDICATIONS  (STANDING):  acetaminophen     Tablet .. 975 milliGRAM(s) Oral <User Schedule>  diphtheria/tetanus/pertussis (acellular) Vaccine (Adacel) 0.5 milliLiter(s) IntraMuscular once  heparin   Injectable 5000 Unit(s) SubCutaneous every 12 hours  ibuprofen  Tablet. 400 milliGRAM(s) Oral every 6 hours  influenza   Vaccine 0.5 milliLiter(s) IntraMuscular once  lactated ringers Bolus 1000 milliLiter(s) IV Bolus once  lactated ringers. 1000 milliLiter(s) (125 mL/Hr) IV Continuous <Continuous>  oxytocin Infusion 42 milliUNIT(s)/Min (42 mL/Hr) IV Continuous <Continuous>  oxytocin Infusion. 4 milliUNIT(s)/Min (4 mL/Hr) IV Continuous <Continuous>  sodium chloride 0.9%. 1000 milliLiter(s) (125 mL/Hr) IV Continuous <Continuous>    MEDICATIONS  (PRN):  diphenhydrAMINE 25 milliGRAM(s) Oral every 6 hours PRN Pruritus  lanolin Ointment 1 Application(s) Topical every 6 hours PRN Sore Nipples  magnesium hydroxide Suspension 30 milliLiter(s) Oral two times a day PRN Constipation  oxyCODONE    IR 5 milliGRAM(s) Oral once PRN Moderate to Severe Pain (4-10)  oxyCODONE    IR 5 milliGRAM(s) Oral every 3 hours PRN Moderate to Severe Pain (4-10)  simethicone 80 milliGRAM(s) Chew every 4 hours PRN Gas  
R1 Progress Note    Patient seen and examined at bedside, no acute overnight events. No acute complaints, pain well controlled. Patient is ambulating and tolerating regular diet. Pt has passed flatus and is voiding spontaneously. Denies fever, chills, CP, SOB, N/V, HA, blurred vision.     Vital Signs Last 24 Hours  T(C): 36.9 (10-14-24 @ 21:00), Max: 36.9 (10-14-24 @ 21:00)  HR: 93 (10-14-24 @ 21:00) (88 - 100)  BP: 110/71 (10-14-24 @ 21:00) (102/68 - 115/78)  RR: 18 (10-14-24 @ 21:00) (18 - 18)  SpO2: 96% (10-14-24 @ 21:00) (96% - 98%)    I&O's Summary    13 Oct 2024 07:01  -  14 Oct 2024 07:00  --------------------------------------------------------  IN: 0 mL / OUT: 2400 mL / NET: -2400 mL      General: NAD  Pulm: breathing comfortably on room air   Abdomen: Soft, appropriately-tender, mildly-distended, fundus firm  Incision: Pfannenstiel incision CDI  Pelvic: Lochia wnl  Labs:    Blood Type: A Positive  Antibody Screen: Negative  RPR: Negative               8.1    20.12 )-----------( 195      ( 10-14 @ 06:39 )             25.2                10.1   19.46 )-----------( 184      ( 10-13 @ 07:38 )             31.0                12.7   10.46 )-----------( 248      ( 10-12 @ 12:19 )             38.6       MEDICATIONS  (STANDING):  acetaminophen     Tablet .. 975 milliGRAM(s) Oral <User Schedule>  diphtheria/tetanus/pertussis (acellular) Vaccine (Adacel) 0.5 milliLiter(s) IntraMuscular once  heparin   Injectable 5000 Unit(s) SubCutaneous every 12 hours  ibuprofen  Tablet. 600 milliGRAM(s) Oral every 6 hours  influenza   Vaccine 0.5 milliLiter(s) IntraMuscular once  lactated ringers Bolus 1000 milliLiter(s) IV Bolus once  lactated ringers. 1000 milliLiter(s) (125 mL/Hr) IV Continuous <Continuous>  oxytocin Infusion 42 milliUNIT(s)/Min (42 mL/Hr) IV Continuous <Continuous>  oxytocin Infusion. 4 milliUNIT(s)/Min (4 mL/Hr) IV Continuous <Continuous>  piperacillin/tazobactam IVPB.. 4.5 Gram(s) IV Intermittent every 8 hours  sodium chloride 0.9%. 1000 milliLiter(s) (125 mL/Hr) IV Continuous <Continuous>    MEDICATIONS  (PRN):  diphenhydrAMINE 25 milliGRAM(s) Oral every 6 hours PRN Pruritus  lanolin Ointment 1 Application(s) Topical every 6 hours PRN Sore Nipples  magnesium hydroxide Suspension 30 milliLiter(s) Oral two times a day PRN Constipation  oxyCODONE    IR 5 milliGRAM(s) Oral once PRN Moderate to Severe Pain (4-10)  oxyCODONE    IR 5 milliGRAM(s) Oral every 3 hours PRN Moderate to Severe Pain (4-10)  simethicone 80 milliGRAM(s) Chew every 4 hours PRN Gas  
R1 Progress Note    Patient seen and examined at bedside, no acute overnight events. No acute complaints, pain well controlled. Patient is ambulating and tolerating regular diet. Pt has passed flatus and is voiding spontaneously. Denies fever, chills, CP, SOB, N/V, HA, blurred vision.     Vital Signs Last 24 Hours  T(C): 36.7 (10-14-24 @ 01:00), Max: 38.1 (10-13-24 @ 06:45)  HR: 79 (10-14-24 @ 01:00) (79 - 128)  BP: 98/61 (10-14-24 @ 01:00) (96/63 - 147/84)  RR: 18 (10-14-24 @ 01:00) (18 - 18)  SpO2: 99% (10-14-24 @ 01:00) (94% - 99%)    I&O's Summary    12 Oct 2024 07:01  -  13 Oct 2024 07:00  --------------------------------------------------------  IN: 4000 mL / OUT: 5207 mL / NET: -1207 mL    13 Oct 2024 07:01  -  14 Oct 2024 04:51  --------------------------------------------------------  IN: 0 mL / OUT: 2400 mL / NET: -2400 mL        Physical Exam:  General: NAD  Pulm: breathing comfortably on room air   Abdomen: Soft, appropriately-tender, mildly-distended, fundus firm  Incision: Pfannenstiel incision CDI  Pelvic: Lochia wnl    Labs:    Blood Type: A Positive  Antibody Screen: Negative  RPR: Negative               10.1   19.46 )-----------( 184      ( 10-13 @ 07:38 )             31.0                12.7   10.46 )-----------( 248      ( 10-12 @ 12:19 )             38.6         MEDICATIONS  (STANDING):  acetaminophen     Tablet .. 975 milliGRAM(s) Oral <User Schedule>  diphtheria/tetanus/pertussis (acellular) Vaccine (Adacel) 0.5 milliLiter(s) IntraMuscular once  heparin   Injectable 5000 Unit(s) SubCutaneous every 12 hours  ibuprofen  Tablet. 600 milliGRAM(s) Oral every 6 hours  influenza   Vaccine 0.5 milliLiter(s) IntraMuscular once  ketorolac   Injectable 30 milliGRAM(s) IV Push every 6 hours  lactated ringers Bolus 1000 milliLiter(s) IV Bolus once  lactated ringers. 1000 milliLiter(s) (125 mL/Hr) IV Continuous <Continuous>  oxytocin Infusion 42 milliUNIT(s)/Min (42 mL/Hr) IV Continuous <Continuous>  oxytocin Infusion. 4 milliUNIT(s)/Min (4 mL/Hr) IV Continuous <Continuous>  piperacillin/tazobactam IVPB.. 4.5 Gram(s) IV Intermittent every 8 hours  sodium chloride 0.9%. 1000 milliLiter(s) (125 mL/Hr) IV Continuous <Continuous>    MEDICATIONS  (PRN):  diphenhydrAMINE 25 milliGRAM(s) Oral every 6 hours PRN Pruritus  lanolin Ointment 1 Application(s) Topical every 6 hours PRN Sore Nipples  magnesium hydroxide Suspension 30 milliLiter(s) Oral two times a day PRN Constipation  oxyCODONE    IR 5 milliGRAM(s) Oral once PRN Moderate to Severe Pain (4-10)  oxyCODONE    IR 5 milliGRAM(s) Oral every 3 hours PRN Moderate to Severe Pain (4-10)  simethicone 80 milliGRAM(s) Chew every 4 hours PRN Gas

## 2024-10-18 LAB — SURGICAL PATHOLOGY STUDY: SIGNIFICANT CHANGE UP

## 2024-11-26 ENCOUNTER — TRANSCRIPTION ENCOUNTER (OUTPATIENT)
Age: 34
End: 2024-11-26

## 2024-12-09 ENCOUNTER — TRANSCRIPTION ENCOUNTER (OUTPATIENT)
Age: 34
End: 2024-12-09

## 2024-12-17 PROBLEM — K90.0 CELIAC DISEASE: Chronic | Status: ACTIVE | Noted: 2024-10-12

## 2024-12-23 ENCOUNTER — TRANSCRIPTION ENCOUNTER (OUTPATIENT)
Age: 34
End: 2024-12-23

## 2024-12-30 ENCOUNTER — TRANSCRIPTION ENCOUNTER (OUTPATIENT)
Age: 34
End: 2024-12-30

## 2025-01-06 ENCOUNTER — TRANSCRIPTION ENCOUNTER (OUTPATIENT)
Age: 35
End: 2025-01-06

## 2025-01-22 ENCOUNTER — TRANSCRIPTION ENCOUNTER (OUTPATIENT)
Age: 35
End: 2025-01-22

## 2025-01-24 ENCOUNTER — TRANSCRIPTION ENCOUNTER (OUTPATIENT)
Age: 35
End: 2025-01-24

## 2025-03-17 NOTE — OB RN DELIVERY SUMMARY - [id number]_FT
Copied from CRM #[id number]. Topic:  Schedule Appointment -  Schedule Primary Care  >> Mar 17, 2025  4:21 PM Izabel SHEIKH wrote:  Amado Patel called requesting to schedule a primary care visit with a Clinician. Checked insurance.  Looked for any active requests, recalls, service to orders, scheduling tickets prior to scheduling. The decision tree DT PC Was used for scheduling (an)     Non-Acute need. Not scheduled and followed instructions to message or transfer. Sent a message. Selected 'Wrap up CRM' and created new Telephone Encounter after clicking 'Convert to Clinical Call'. Selected reason for call 'Appointment'. Sent 'Appointment' template and routed as routine priority per Clinician KB page to appropriate clinician pool.-- DO NOT REPLY / DO NOT REPLY ALL --  -- This inbox is not monitored. If this was sent to the wrong provider or department, reroute message to P ECO Reroute pool. --  -- Message is from Engagement Center Operations (ECO) --  Reason for Appointment Message: Caller wants sooner pc tcm  appointment - all locations with clinician were offered    Reason for Visit: 3/15/2025 / [facility] / [facility]     Is the patient currently scheduled? No    Preferred time to be seen: FRIDAY OR SATURDAY     Caller Information       Contact Date/Time Type Contact Phone/Fax    03/17/2025 04:21 PM CDT Phone (Incoming) Amado Patel [phone]            Alternative phone number: n/a     Can a detailed message be left?  Yes - Voicemail   Patient has been advised the message will be addressed within 2-3 business days           
Keyla Gale RN

## 2025-09-02 ENCOUNTER — EMERGENCY (EMERGENCY)
Facility: HOSPITAL | Age: 35
LOS: 0 days | Discharge: HOME | End: 2025-09-02
Attending: STUDENT IN AN ORGANIZED HEALTH CARE EDUCATION/TRAINING PROGRAM
Payer: COMMERCIAL

## 2025-09-02 VITALS
RESPIRATION RATE: 16 BRPM | TEMPERATURE: 98 F | OXYGEN SATURATION: 100 % | SYSTOLIC BLOOD PRESSURE: 104 MMHG | DIASTOLIC BLOOD PRESSURE: 70 MMHG | HEART RATE: 86 BPM

## 2025-09-02 VITALS
TEMPERATURE: 98 F | HEIGHT: 64 IN | WEIGHT: 119.93 LBS | DIASTOLIC BLOOD PRESSURE: 76 MMHG | RESPIRATION RATE: 18 BRPM | OXYGEN SATURATION: 100 % | SYSTOLIC BLOOD PRESSURE: 109 MMHG | HEART RATE: 80 BPM

## 2025-09-02 DIAGNOSIS — Z86.19 PERSONAL HISTORY OF OTHER INFECTIOUS AND PARASITIC DISEASES: Chronic | ICD-10-CM

## 2025-09-02 DIAGNOSIS — Z90.89 ACQUIRED ABSENCE OF OTHER ORGANS: Chronic | ICD-10-CM

## 2025-09-02 DIAGNOSIS — Z98.890 OTHER SPECIFIED POSTPROCEDURAL STATES: Chronic | ICD-10-CM

## 2025-09-02 DIAGNOSIS — R10.816 EPIGASTRIC ABDOMINAL TENDERNESS: ICD-10-CM

## 2025-09-02 DIAGNOSIS — R11.2 NAUSEA WITH VOMITING, UNSPECIFIED: ICD-10-CM

## 2025-09-02 DIAGNOSIS — Z91.018 ALLERGY TO OTHER FOODS: ICD-10-CM

## 2025-09-02 LAB
ALBUMIN SERPL ELPH-MCNC: 4.3 G/DL — SIGNIFICANT CHANGE UP (ref 3.3–5)
ALP SERPL-CCNC: 106 U/L — SIGNIFICANT CHANGE UP (ref 40–120)
ALT FLD-CCNC: 23 U/L — SIGNIFICANT CHANGE UP (ref 12–78)
ANION GAP SERPL CALC-SCNC: 5 MMOL/L — SIGNIFICANT CHANGE UP (ref 5–17)
AST SERPL-CCNC: 16 U/L — SIGNIFICANT CHANGE UP (ref 15–37)
BASOPHILS # BLD AUTO: 0.04 K/UL — SIGNIFICANT CHANGE UP (ref 0–0.2)
BASOPHILS NFR BLD AUTO: 0.3 % — SIGNIFICANT CHANGE UP (ref 0–2)
BILIRUB SERPL-MCNC: 0.6 MG/DL — SIGNIFICANT CHANGE UP (ref 0.2–1.2)
BUN SERPL-MCNC: 18 MG/DL — SIGNIFICANT CHANGE UP (ref 7–23)
CALCIUM SERPL-MCNC: 9.6 MG/DL — SIGNIFICANT CHANGE UP (ref 8.5–10.1)
CHLORIDE SERPL-SCNC: 107 MMOL/L — SIGNIFICANT CHANGE UP (ref 96–108)
CO2 SERPL-SCNC: 29 MMOL/L — SIGNIFICANT CHANGE UP (ref 22–31)
CREAT SERPL-MCNC: 0.79 MG/DL — SIGNIFICANT CHANGE UP (ref 0.5–1.3)
EGFR: 99 ML/MIN/1.73M2 — SIGNIFICANT CHANGE UP
EGFR: 99 ML/MIN/1.73M2 — SIGNIFICANT CHANGE UP
EOSINOPHIL # BLD AUTO: 0.04 K/UL — SIGNIFICANT CHANGE UP (ref 0–0.5)
EOSINOPHIL NFR BLD AUTO: 0.3 % — SIGNIFICANT CHANGE UP (ref 0–6)
GLUCOSE SERPL-MCNC: 132 MG/DL — HIGH (ref 70–99)
HCG SERPL-ACNC: <1 MIU/ML — SIGNIFICANT CHANGE UP
HCT VFR BLD CALC: 46.5 % — HIGH (ref 34.5–45)
HGB BLD-MCNC: 15.4 G/DL — SIGNIFICANT CHANGE UP (ref 11.5–15.5)
IMM GRANULOCYTES NFR BLD AUTO: 0.4 % — SIGNIFICANT CHANGE UP (ref 0–0.9)
LIDOCAIN IGE QN: 68 U/L — SIGNIFICANT CHANGE UP (ref 13–75)
LYMPHOCYTES # BLD AUTO: 0.54 K/UL — LOW (ref 1–3.3)
LYMPHOCYTES # BLD AUTO: 4 % — LOW (ref 13–44)
MCHC RBC-ENTMCNC: 30.3 PG — SIGNIFICANT CHANGE UP (ref 27–34)
MCHC RBC-ENTMCNC: 33.1 G/DL — SIGNIFICANT CHANGE UP (ref 32–36)
MCV RBC AUTO: 91.5 FL — SIGNIFICANT CHANGE UP (ref 80–100)
MONOCYTES # BLD AUTO: 0.44 K/UL — SIGNIFICANT CHANGE UP (ref 0–0.9)
MONOCYTES NFR BLD AUTO: 3.2 % — SIGNIFICANT CHANGE UP (ref 2–14)
NEUTROPHILS # BLD AUTO: 12.43 K/UL — HIGH (ref 1.8–7.4)
NEUTROPHILS NFR BLD AUTO: 91.8 % — HIGH (ref 43–77)
NRBC BLD AUTO-RTO: 0 /100 WBCS — SIGNIFICANT CHANGE UP (ref 0–0)
PLATELET # BLD AUTO: 293 K/UL — SIGNIFICANT CHANGE UP (ref 150–400)
POTASSIUM SERPL-MCNC: 3.8 MMOL/L — SIGNIFICANT CHANGE UP (ref 3.5–5.3)
POTASSIUM SERPL-SCNC: 3.8 MMOL/L — SIGNIFICANT CHANGE UP (ref 3.5–5.3)
PROT SERPL-MCNC: 8.5 GM/DL — HIGH (ref 6–8.3)
RBC # BLD: 5.08 M/UL — SIGNIFICANT CHANGE UP (ref 3.8–5.2)
RBC # FLD: 12.3 % — SIGNIFICANT CHANGE UP (ref 10.3–14.5)
SODIUM SERPL-SCNC: 141 MMOL/L — SIGNIFICANT CHANGE UP (ref 135–145)
WBC # BLD: 13.54 K/UL — HIGH (ref 3.8–10.5)
WBC # FLD AUTO: 13.54 K/UL — HIGH (ref 3.8–10.5)

## 2025-09-02 PROCEDURE — 99284 EMERGENCY DEPT VISIT MOD MDM: CPT

## 2025-09-02 RX ORDER — ACETAMINOPHEN 500 MG/5ML
1000 LIQUID (ML) ORAL ONCE
Refills: 0 | Status: COMPLETED | OUTPATIENT
Start: 2025-09-02 | End: 2025-09-02

## 2025-09-02 RX ORDER — ONDANSETRON HCL/PF 4 MG/2 ML
1 VIAL (ML) INJECTION
Qty: 9 | Refills: 0
Start: 2025-09-02 | End: 2025-09-04

## 2025-09-02 RX ORDER — SODIUM CHLORIDE 9 G/1000ML
1000 INJECTION, SOLUTION INTRAVENOUS ONCE
Refills: 0 | Status: COMPLETED | OUTPATIENT
Start: 2025-09-02 | End: 2025-09-02

## 2025-09-02 RX ORDER — ONDANSETRON HCL/PF 4 MG/2 ML
4 VIAL (ML) INJECTION ONCE
Refills: 0 | Status: COMPLETED | OUTPATIENT
Start: 2025-09-02 | End: 2025-09-02

## 2025-09-02 RX ORDER — MAGNESIUM, ALUMINUM HYDROXIDE 200-200 MG
30 TABLET,CHEWABLE ORAL ONCE
Refills: 0 | Status: COMPLETED | OUTPATIENT
Start: 2025-09-02 | End: 2025-09-02

## 2025-09-02 RX ADMIN — Medication 30 MILLILITER(S): at 06:23

## 2025-09-02 RX ADMIN — Medication 1000 MILLIGRAM(S): at 07:08

## 2025-09-02 RX ADMIN — SODIUM CHLORIDE 1000 MILLILITER(S): 9 INJECTION, SOLUTION INTRAVENOUS at 06:25

## 2025-09-02 RX ADMIN — Medication 4 MILLIGRAM(S): at 06:25

## 2025-09-02 RX ADMIN — Medication 400 MILLIGRAM(S): at 06:24

## 2025-09-02 RX ADMIN — Medication 20 MILLIGRAM(S): at 06:25
